# Patient Record
Sex: FEMALE | Race: BLACK OR AFRICAN AMERICAN | Employment: FULL TIME | URBAN - METROPOLITAN AREA
[De-identification: names, ages, dates, MRNs, and addresses within clinical notes are randomized per-mention and may not be internally consistent; named-entity substitution may affect disease eponyms.]

---

## 2021-11-10 PROBLEM — Z01.419 WOMEN'S ANNUAL ROUTINE GYNECOLOGICAL EXAMINATION: Status: ACTIVE | Noted: 2021-11-10

## 2021-11-10 PROBLEM — Z30.09 CONTRACEPTIVE EDUCATION: Status: ACTIVE | Noted: 2021-11-10

## 2021-11-10 PROBLEM — R10.2 PELVIC PAIN: Status: ACTIVE | Noted: 2021-11-10

## 2021-11-10 PROBLEM — N89.8 VAGINAL DISCHARGE: Status: ACTIVE | Noted: 2021-11-10

## 2021-12-08 PROBLEM — R87.810 CERVICAL HIGH RISK HPV (HUMAN PAPILLOMAVIRUS) TEST POSITIVE: Status: ACTIVE | Noted: 2021-12-08

## 2022-02-03 ENCOUNTER — HOSPITAL ENCOUNTER (OUTPATIENT)
Dept: LAB | Age: 32
Discharge: HOME | End: 2022-02-03

## 2022-02-03 PROBLEM — N92.0 MENORRHAGIA WITH REGULAR CYCLE: Status: ACTIVE | Noted: 2022-02-03

## 2022-02-03 PROBLEM — F34.1 DYSTHYMIA: Status: ACTIVE | Noted: 2022-02-03

## 2022-02-03 PROBLEM — N94.6 DYSMENORRHEA: Status: ACTIVE | Noted: 2022-02-03

## 2022-02-03 PROCEDURE — 88305 TISSUE EXAM BY PATHOLOGIST: CPT

## 2022-03-18 PROBLEM — N92.0 MENORRHAGIA WITH REGULAR CYCLE: Status: ACTIVE | Noted: 2022-02-03

## 2022-03-18 PROBLEM — Z30.09 CONTRACEPTIVE EDUCATION: Status: ACTIVE | Noted: 2021-11-10

## 2022-03-18 PROBLEM — R87.810 CERVICAL HIGH RISK HPV (HUMAN PAPILLOMAVIRUS) TEST POSITIVE: Status: ACTIVE | Noted: 2021-12-08

## 2022-03-19 PROBLEM — Z01.419 WOMEN'S ANNUAL ROUTINE GYNECOLOGICAL EXAMINATION: Status: ACTIVE | Noted: 2021-11-10

## 2022-03-19 PROBLEM — F34.1 DYSTHYMIA: Status: ACTIVE | Noted: 2022-02-03

## 2022-03-20 PROBLEM — N94.6 DYSMENORRHEA: Status: ACTIVE | Noted: 2022-02-03

## 2022-07-28 ENCOUNTER — TELEPHONE (OUTPATIENT)
Dept: OBGYN CLINIC | Age: 32
End: 2022-07-28

## 2022-07-28 NOTE — TELEPHONE ENCOUNTER
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 I called the patient back after talking with Dr. Jessica Izaguirre and he says that if the patient got the varicella vaccine she should be protected. I gave this information to the patient and she verbalizes understanding. She has a new ob appointment with us already scheduled.

## 2022-08-09 ENCOUNTER — ROUTINE PRENATAL (OUTPATIENT)
Dept: OBGYN CLINIC | Age: 32
End: 2022-08-09
Payer: COMMERCIAL

## 2022-08-09 VITALS
BODY MASS INDEX: 40.93 KG/M2 | HEIGHT: 67 IN | SYSTOLIC BLOOD PRESSURE: 112 MMHG | WEIGHT: 260.8 LBS | DIASTOLIC BLOOD PRESSURE: 78 MMHG

## 2022-08-09 DIAGNOSIS — Z34.81 MULTIGRAVIDA IN FIRST TRIMESTER: ICD-10-CM

## 2022-08-09 DIAGNOSIS — O26.849 FETAL SIZE INCONSISTENT WITH DATES: Primary | ICD-10-CM

## 2022-08-09 DIAGNOSIS — O34.01 SEPTATE UTERUS AFFECTING PREGNANCY IN FIRST TRIMESTER: ICD-10-CM

## 2022-08-09 DIAGNOSIS — Q51.28 SEPTATE UTERUS AFFECTING PREGNANCY IN FIRST TRIMESTER: ICD-10-CM

## 2022-08-09 DIAGNOSIS — O36.80X0 ENCOUNTER TO DETERMINE FETAL VIABILITY OF PREGNANCY, SINGLE OR UNSPECIFIED FETUS: ICD-10-CM

## 2022-08-09 PROBLEM — F34.1 DYSTHYMIA: Status: RESOLVED | Noted: 2022-02-03 | Resolved: 2022-08-09

## 2022-08-09 PROBLEM — N94.6 DYSMENORRHEA: Status: RESOLVED | Noted: 2022-02-03 | Resolved: 2022-08-09

## 2022-08-09 PROBLEM — N92.0 MENORRHAGIA WITH REGULAR CYCLE: Status: RESOLVED | Noted: 2022-02-03 | Resolved: 2022-08-09

## 2022-08-09 PROBLEM — Z30.09 CONTRACEPTIVE EDUCATION: Status: RESOLVED | Noted: 2021-11-10 | Resolved: 2022-08-09

## 2022-08-09 PROBLEM — Z01.419 WOMEN'S ANNUAL ROUTINE GYNECOLOGICAL EXAMINATION: Status: RESOLVED | Noted: 2021-11-10 | Resolved: 2022-08-09

## 2022-08-09 LAB
ABO + RH BLD: NORMAL
BLOOD GROUP ANTIBODIES SERPL: NORMAL
ERYTHROCYTE [DISTWIDTH] IN BLOOD BY AUTOMATED COUNT: 12.7 % (ref 11.9–14.6)
HBV SURFACE AG SER QL: NONREACTIVE
HCT VFR BLD AUTO: 39 % (ref 35.8–46.3)
HCV AB SER QL: NONREACTIVE
HGB BLD-MCNC: 13 G/DL (ref 11.7–15.4)
HIV 1+2 AB+HIV1 P24 AG SERPL QL IA: NONREACTIVE
HIV 1/2 RESULT COMMENT: NORMAL
MCH RBC QN AUTO: 29.7 PG (ref 26.1–32.9)
MCHC RBC AUTO-ENTMCNC: 33.3 G/DL (ref 31.4–35)
MCV RBC AUTO: 89.2 FL (ref 79.6–97.8)
NRBC # BLD: 0 K/UL (ref 0–0.2)
PLATELET # BLD AUTO: 435 K/UL (ref 150–450)
PMV BLD AUTO: 10 FL (ref 9.4–12.3)
RBC # BLD AUTO: 4.37 M/UL (ref 4.05–5.2)
RUBV IGG SERPL IA-ACNC: 36.2 IU/ML (ref 0–50)
WBC # BLD AUTO: 5 K/UL (ref 4.3–11.1)

## 2022-08-09 PROCEDURE — 76801 OB US < 14 WKS SINGLE FETUS: CPT | Performed by: NURSE PRACTITIONER

## 2022-08-09 PROCEDURE — 99902 PR PRENATAL VISIT: CPT | Performed by: NURSE PRACTITIONER

## 2022-08-09 NOTE — PROGRESS NOTES
activity: Not on file   Other Topics Concern    Not on file   Social History Narrative    Abuse: Feels safe at home, no history of physical abuse, no history of sexual abuse      Social Determinants of Health     Financial Resource Strain: Not on file   Food Insecurity: Not on file   Transportation Needs: Not on file   Physical Activity: Not on file   Stress: Not on file   Social Connections: Not on file   Intimate Partner Violence: Not on file   Housing Stability: Not on file           Objective    Vitals:    08/09/22 1038   BP: 112/78   Site: Left Upper Arm   Position: Sitting   Weight: 260 lb 12.8 oz (118.3 kg)   Height: 5' 7\" (1.702 m)       General: well developed, well nourished, in no acute distress    Head: normocephalic and atraumatic    Resp: even and unlabored    Psych: Normal mood and affect        Assessment and Plan      Patient Active Problem List    Diagnosis Date Noted    Multigravida in first trimester 08/09/2022     Priority: Medium     Overview Note:     RENEE 3/16/2022 by 8wk US (not c/w LMP)       Assessment & Plan Note:     History reviewed  PNV's ordered (if not already taking)  PN labs, UDS ordered  Problem list reviewed  OB physical completed  OB prenatal packet/education reviewed: Information included discusses general pregnancy topics, fetal development, weight gain, nutrition, breastfeeding, risky behaviors, WIC, vaccinations and hospital information. RTO 4 weeks  PTL/labor precautions, [address], and pregnancy warning signs reviewed. Genetic testing - D/W pt at length genetic testing that is recommended by ACOG -- NIPT, Quad screen (for trisomies and NTD), CF, SMA. Brief discussion of these diseases - conditions that may increase risks, etiology, carrier states, fetal effects, treatment options, etc was undertaken. D/W pt that these are screening tests/carrier screening test only and are NOT mandatory. We also discuss false POS/false neg rates.  It is her decision whether to have them done and how to proceed with the information afterwards. 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 septate vs bicornuate uterus. . Pt denies previous dx of this or  any complications/PTL in previous pregnancies. 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 RTO 4 weeks  PTL/labor precautions, [address], and pregnancy warning signs reviewed. Genetic testing - D/W pt at length genetic testing that is recommended by ACOG -- NIPT, Quad screen (for trisomies and NTD), CF, SMA. Brief discussion of these diseases - conditions that may increase risks, etiology, carrier states, fetal effects, treatment options, etc was undertaken. D/W pt that these are screening tests/carrier screening test only and are NOT mandatory. We also discuss false POS/false neg rates. It is her decision whether to have them done and how to proceed with the information afterwards.                  Relevant Orders    ABO/Rh    Antibody Screen    CBC    Hemoglobinopathy Evaluation    Hepatitis B Surface Antigen    Hepatitis C Antibody    RPR    HIV 1/2 Ag/Ab, 4TH Generation,W Rflx Confirm    Rubella antibody, IgG    Hemoglobin A1C    Chlamydia, Gonorrhea, Trichomoniasis   Other Visit Diagnoses     Fetal size inconsistent with dates    -  Primary    Relevant Orders    AMB POC US OB < 14 WKS, 1ST GESTATION (Completed)    Encounter to determine fetal viability of pregnancy, single or unspecified fetus        Relevant Orders    AMB POC US OB < 14 WKS, 1ST GESTATION (Completed)          Orders Placed This Encounter   Procedures    Chlamydia, Gonorrhea, Trichomoniasis    AMB POC US OB < 14 WKS, 1ST GESTATION    CBC    Hemoglobinopathy Evaluation    Hepatitis B Surface Antigen    Hepatitis C Antibody    RPR    HIV 1/2 Ag/Ab, 4TH Generation,W Rflx Confirm    Rubella antibody, IgG    Hemoglobin A1C    ABO/Rh    Antibody Screen       Outpatient Encounter Medications as of 8/9/2022   Medication Sig Dispense Refill    Prenatal Vit-Fe Fumarate-FA (PRENATAL PO) Take by mouth      [DISCONTINUED] norethindrone-ethinyl estradiol (JUNEL FE 1/20) 1-20 MG-MCG per tablet Take 1 tablet by mouth daily       No facility-administered encounter medications on file as of 8/9/2022.                Labor signs, pregnancy warning signs, and fetal movement counting reviewed (if applicable)        Debra Ledesma NP, APRN - CNP 08/09/22 11:03 AM

## 2022-08-09 NOTE — PROGRESS NOTES
Patient comes in today for initial prenatal visit. No complaints/concerns today. Fetal Movements:  No  Contractions:  No  Vaginal Bleeding: No  Leaking Fluid:  No  GI/ issues: No    Drug/Alcohol 4P's Plus Screening    1. Have either of your parents ever had a problem with drugs/alcohol/prescription drugs? No  2. Does your partner have a problem with drugs/alcohol/prescription drugs? No  3. In the past, have you ever had a problem with drugs/alcohol/prescription drugs? No  4. Before you were pregnant, in the past month, have you done any drugs, drank any alcohol or abused any prescription drugs? No    If \"YES\" to any of the above, please give further details:  NA      LAST PAP:  10/8/2021 negative, hpv +    LAST MAMMO:  never    LMP:  Patient's last menstrual period was 05/20/2022.       FAMILY HISTORY OF:   Breast Cancer:  No   Ovarian Cancer: No   Uterine Cancer:  No   Colon Cancer:  No    Vitals:    08/09/22 1038   BP: 112/78   Site: Left Upper Arm   Position: Sitting   Weight: 260 lb 12.8 oz (118.3 kg)   Height: 5' 7\" (1.702 m)       Zhang Hernandez MA  08/09/22  10:49 AM

## 2022-08-09 NOTE — PROGRESS NOTES
I have reviewed the patient's visit today including history, exam and assessment by CORTNEY Ross. I agree with treatment/plan as above.     Idris Méndez MD  11:23 AM  08/09/22

## 2022-08-09 NOTE — ASSESSMENT & PLAN NOTE
History reviewed  PNV's ordered (if not already taking)  PN labs, UDS ordered  Problem list reviewed  OB physical completed  OB prenatal packet/education reviewed: Information included discusses general pregnancy topics, fetal development, weight gain, nutrition, breastfeeding, risky behaviors, WIC, vaccinations and hospital information. RTO 4 weeks  PTL/labor precautions, [facility], and pregnancy warning signs reviewed. Genetic testing - D/W pt at length genetic testing that is recommended by ACOG -- NIPT, Quad screen (for trisomies and NTD), CF, SMA. Brief discussion of these diseases - conditions that may increase risks, etiology, carrier states, fetal effects, treatment options, etc was undertaken. D/W pt that these are screening tests/carrier screening test only and are NOT mandatory. We also discuss false POS/false neg rates. It is her decision whether to have them done and how to proceed with the information afterwards.

## 2022-08-09 NOTE — PATIENT INSTRUCTIONS
Thank you for coming. Return to the office in 4 weeks. Please call if you have any abdominal pain and 5 contractions in 1 hour, vaginal bleeding or spotting, or leaking of fluid.

## 2022-08-10 LAB
EST. AVERAGE GLUCOSE BLD GHB EST-MCNC: 105 MG/DL
HBA1C MFR BLD: 5.3 % (ref 4.8–5.6)
RPR SER QL: NONREACTIVE

## 2022-08-11 ENCOUNTER — HOSPITAL ENCOUNTER (EMERGENCY)
Age: 32
Discharge: HOME | End: 2022-08-11
Attending: EMERGENCY MEDICINE
Payer: COMMERCIAL

## 2022-08-11 VITALS
DIASTOLIC BLOOD PRESSURE: 80 MMHG | SYSTOLIC BLOOD PRESSURE: 125 MMHG | RESPIRATION RATE: 18 BRPM | BODY MASS INDEX: 40.81 KG/M2 | TEMPERATURE: 98.2 F | HEART RATE: 83 BPM | OXYGEN SATURATION: 99 % | HEIGHT: 67 IN | WEIGHT: 260 LBS

## 2022-08-11 DIAGNOSIS — R10.32 ABDOMINAL PAIN, LEFT LOWER QUADRANT: Primary | ICD-10-CM

## 2022-08-11 DIAGNOSIS — N83.202 CYST OF LEFT OVARY: ICD-10-CM

## 2022-08-11 LAB
HCG UR QL: POSITIVE
HGB A MFR BLD: 97.4 % (ref 96.4–98.8)
HGB A2 MFR BLD COLUMN CHROM: 2.6 % (ref 1.8–3.2)
HGB F MFR BLD: 0 % (ref 0–2)
HGB FRACT BLD-IMP: NORMAL
HGB S MFR BLD: 0 %

## 2022-08-11 PROCEDURE — 99283 EMERGENCY DEPT VISIT LOW MDM: CPT

## 2022-08-11 PROCEDURE — 81025 URINE PREGNANCY TEST: CPT

## 2022-08-11 RX ORDER — HYDROCODONE BITARTRATE AND ACETAMINOPHEN 5; 325 MG/1; MG/1
1 TABLET ORAL EVERY 6 HOURS PRN
Qty: 17 TABLET | Refills: 0 | Status: SHIPPED | OUTPATIENT
Start: 2022-08-11 | End: 2022-08-14

## 2022-08-11 ASSESSMENT — PAIN SCALES - GENERAL
PAINLEVEL_OUTOF10: 3
PAINLEVEL_OUTOF10: 3

## 2022-08-11 ASSESSMENT — ENCOUNTER SYMPTOMS
CONSTIPATION: 0
HEMATEMESIS: 0
EYE REDNESS: 0
EYE PAIN: 0
SINUS PAIN: 0
VOMITING: 0
HEMATOCHEZIA: 0
DIARRHEA: 0
SHORTNESS OF BREATH: 0
EYE ITCHING: 0
WHEEZING: 0
COUGH: 0
ABDOMINAL PAIN: 1
BACK PAIN: 0
TROUBLE SWALLOWING: 0
NAUSEA: 1

## 2022-08-11 ASSESSMENT — PAIN DESCRIPTION - DESCRIPTORS: DESCRIPTORS: ACHING

## 2022-08-11 ASSESSMENT — PAIN - FUNCTIONAL ASSESSMENT: PAIN_FUNCTIONAL_ASSESSMENT: 0-10

## 2022-08-11 ASSESSMENT — PAIN DESCRIPTION - LOCATION: LOCATION: ABDOMEN

## 2022-08-11 ASSESSMENT — PAIN DESCRIPTION - ORIENTATION: ORIENTATION: LEFT

## 2022-08-11 NOTE — ED NOTES
I have reviewed discharge instructions with the patient. The patient verbalized understanding. Patient left ED via Discharge Method: ambulatory to Home with SELF. Opportunity for questions and clarification provided. Patient given 1 scripts. To continue your aftercare when you leave the hospital, you may receive an automated call from our care team to check in on how you are doing. This is a free service and part of our promise to provide the best care and service to meet your aftercare needs.  If you have questions, or wish to unsubscribe from this service please call [phone]. Thank you for Choosing our [address] [place] [address] Emergency Department.         Stalin Gutierrez RN  08/11/22 6928

## 2022-08-11 NOTE — ED PROVIDER NOTES
[facility] Emergency Department Provider Note                   PCP:                None Provider               Age: 27 y.o. Sex: female       ICD-10-CM    1. Abdominal pain, left lower quadrant  R10.32 HYDROcodone-acetaminophen (NORCO) 5-325 MG per tablet      2. 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 2:14 AM  Urine negative for infection    We will start the patient on some pain medication  Strongly encouraged her to follow-up with her OB    Discussed return precautions       Amount and/or Complexity of Data Reviewed  Clinical lab tests: ordered and reviewed  Tests in the radiology section of CPT®: reviewed  Tests in the medicine section of CPT®: reviewed  Decide to obtain previous medical records or to obtain history from someone other than the patient: yes  Review and summarize past medical records: yes  Independent visualization of images, tracings, or specimens: yes    Risk of Complications, Morbidity, and/or Mortality  Presenting problems: moderate  Diagnostic procedures: low  Management options: low  General comments: Elements of this note have been dictated via voice recognition software. Text and phrases may be limited by the accuracy of the software. The chart has been reviewed, but errors may still be present. Patient Progress  Patient progress: stable       Orders Placed This Encounter   Procedures    POC Pregnancy Urine Art Coco is a 27 y.o. female who presents to the Emergency Department with chief complaint of    Chief Complaint   Patient presents with    Abdominal Pain      27 y.o. female patient [id number] at 5 weeks gestation     Due Date is 3/16/2023, by Ultrasound     Presents tonight with complaints of left lower quadrant abdominal pain  Patient reports she had an ultrasound with her OB that revealed a left ovarian cyst.       The history is provided by the patient. Abdominal Pain  Pain location:  LLQ  Pain quality: aching    Pain radiates to:  Does not radiate  Pain severity:  Moderate  Onset quality:  Gradual  Duration:  2 days  Timing:  Constant  Progression:  Waxing and waning  Chronicity:  New  Context: not diet changes, not recent illness and not recent travel    Context comment:  Patient approximately 9 weeks pregnant  Relieved by:  Position changes  Worsened by: Movement  Ineffective treatments:  None tried  Associated symptoms: nausea    Associated symptoms: no chest pain, no chills, no constipation, no cough, no diarrhea, no dysuria, no fatigue, no fever, no hematemesis, no hematochezia, no hematuria, no shortness of breath and no vomiting    Risk factors: pregnancy      All other systems reviewed and are negative. Review of Systems   Constitutional:  Negative for chills, fatigue and fever. HENT:  Negative for ear pain, hearing loss, sinus pain, sneezing and trouble swallowing. Eyes:  Negative for pain, redness and itching. Respiratory:  Negative for cough, shortness of breath and wheezing. Cardiovascular:  Negative for chest pain and palpitations. Gastrointestinal:  Positive for abdominal pain and nausea. Negative for constipation, diarrhea, hematemesis, hematochezia and vomiting. Endocrine: Negative for polydipsia, polyphagia and polyuria. Genitourinary:  Negative for dysuria, flank pain, frequency and hematuria. Musculoskeletal:  Negative for arthralgias, back pain and myalgias. Skin:  Negative for rash and wound. Allergic/Immunologic: Negative for food allergies and immunocompromised state.    Neurological:  Negative for dizziness, syncope, speech difficulty and light-headedness. Hematological:  Negative for adenopathy. Does not bruise/bleed easily. Psychiatric/Behavioral:  Negative for dysphoric mood and self-injury. The patient is not nervous/anxious. All other systems reviewed and are negative. Past Medical History:   Diagnosis Date    Abnormal Papanicolaou smear of cervix     Dysthymia 2/3/2022        History reviewed. No pertinent surgical history. Family History   Problem Relation Age of Onset    Breast Cancer Neg Hx     Colon Cancer Neg Hx     Ovarian Cancer Neg Hx     Uterine Cancer Neg Hx         Social History     Socioeconomic History    Marital status: Single     Spouse name: None    Number of children: None    Years of education: None    Highest education level: None   Tobacco Use    Smoking status: Never    Smokeless tobacco: Never   Substance and Sexual Activity    Alcohol use: No    Drug use: Not Currently   Social History Narrative    Abuse: Feels safe at home, no history of physical abuse, no history of sexual abuse         Allergies: Penicillins    Discharge Medication List as of 8/11/2022  1:33 AM        CONTINUE these medications which have NOT CHANGED    Details   Prenatal Vit-Fe Fumarate-FA (PRENATAL PO) Take by mouthHistorical Med              Vitals signs and nursing note reviewed. Patient Vitals for the past 4 hrs:   Temp Pulse Resp BP SpO2   08/11/22 0130 98.2 °F (36.8 °C) 83 18 125/80 99 %   08/11/22 0038 98.2 °F (36.8 °C) 83 18 125/81 100 %          Physical Exam  Vitals and nursing note reviewed. Constitutional:       General: She is in acute distress. Appearance: Normal appearance. She is well-developed. She is obese. HENT:      Head: Normocephalic and atraumatic. Right Ear: External ear normal.      Left Ear: External ear normal.      Nose: Nose normal.   Eyes:      General: No scleral icterus. Extraocular Movements: Extraocular movements intact.       Conjunctiva/sclera: Conjunctivae normal. Pupils: Pupils are equal, round, and reactive to light. Cardiovascular:      Rate and Rhythm: Normal rate and regular rhythm. Pulses: Normal pulses. Heart sounds: Normal heart sounds. Pulmonary:      Effort: Pulmonary effort is normal. No respiratory distress. Breath sounds: Normal breath sounds. Abdominal:      General: Abdomen is flat. Bowel sounds are normal. There is no distension. Palpations: Abdomen is soft. There is no mass. Tenderness: There is abdominal tenderness in the left lower quadrant. There is no right CVA tenderness or left CVA tenderness. Musculoskeletal:         General: No deformity or signs of injury. Normal range of motion. Cervical back: Normal range of motion and neck supple. Skin:     General: Skin is warm and dry. Capillary Refill: Capillary refill takes less than 2 seconds. Neurological:      General: No focal deficit present. Mental Status: She is alert and oriented to person, place, and time. Psychiatric:         Mood and Affect: Mood normal.         Behavior: Behavior normal.         Thought Content: Thought content normal.         Judgment: Judgment normal.        Procedures    ED EKG Interpretation    Labs Reviewed   POC PREGNANCY UR-QUAL - Abnormal; Notable for the following components:       Result Value    Preg Test, Ur Positive (*)     All other components within normal limits        No orders to display                            Voice dictation software was used during the making of this note. This software is not perfect and grammatical and other typographical errors may be present. This note has not been completely proofread for errors.      Brendon Maravilla MD  08/11/22 8832

## 2022-08-11 NOTE — DISCHARGE INSTRUCTIONS
Take medication as prescribed  Call your OB doctor in the morning to discuss further medication and follow-up treatment  Continue all your current medications  Do not drink alcohol or drive while taking the prescription pain medications    Return to ER for any worsening symptoms or new problems which may arise

## 2022-08-12 LAB
C TRACH RRNA SPEC QL NAA+PROBE: NEGATIVE
N GONORRHOEA RRNA SPEC QL NAA+PROBE: NEGATIVE
SPECIMEN SOURCE: NORMAL
T VAGINALIS RRNA SPEC QL NAA+PROBE: NEGATIVE

## 2022-08-17 ENCOUNTER — ROUTINE PRENATAL (OUTPATIENT)
Dept: OBGYN CLINIC | Age: 32
End: 2022-08-17

## 2022-08-17 VITALS
BODY MASS INDEX: 40.97 KG/M2 | HEIGHT: 67 IN | DIASTOLIC BLOOD PRESSURE: 70 MMHG | SYSTOLIC BLOOD PRESSURE: 122 MMHG | WEIGHT: 261 LBS

## 2022-08-17 DIAGNOSIS — N89.8 VAGINAL DISCHARGE DURING PREGNANCY IN FIRST TRIMESTER: Primary | ICD-10-CM

## 2022-08-17 DIAGNOSIS — O26.891 VAGINAL DISCHARGE DURING PREGNANCY IN FIRST TRIMESTER: Primary | ICD-10-CM

## 2022-08-17 DIAGNOSIS — Z34.81 MULTIGRAVIDA IN FIRST TRIMESTER: ICD-10-CM

## 2022-08-17 DIAGNOSIS — B37.9 YEAST INFECTION: ICD-10-CM

## 2022-08-17 PROCEDURE — 99902 PR PRENATAL VISIT: CPT | Performed by: NURSE PRACTITIONER

## 2022-08-17 ASSESSMENT — PATIENT HEALTH QUESTIONNAIRE - PHQ9
SUM OF ALL RESPONSES TO PHQ9 QUESTIONS 1 & 2: 0
SUM OF ALL RESPONSES TO PHQ QUESTIONS 1-9: 0
2. FEELING DOWN, DEPRESSED OR HOPELESS: 0
SUM OF ALL RESPONSES TO PHQ QUESTIONS 1-9: 0
SUM OF ALL RESPONSES TO PHQ QUESTIONS 1-9: 0
1. LITTLE INTEREST OR PLEASURE IN DOING THINGS: 0
SUM OF ALL RESPONSES TO PHQ QUESTIONS 1-9: 0

## 2022-08-17 NOTE — PROGRESS NOTES
This is a 27 y.o.  [id number] at 9w6d for routine OB visit. Her Estimated Due Date is 3/16/2023, by Ultrasound    Denies leaking of fluid, vaginal bleeding, or regular contractions. Pt is here today due to green discharge and itching that started monday    Current Outpatient Medications on File Prior to Visit   Medication Sig Dispense Refill    Prenatal Vit-Fe Fumarate-FA (PRENATAL PO) Take by mouth       No current facility-administered medications on file prior to visit. Allergies   Allergen Reactions    Penicillins Other (See Comments)       OB History    Para Term  AB Living   4 2 2 0 1 2   SAB IAB Ectopic Molar Multiple Live Births   1 0 0 0 0 2       # 1 - Date: [birth year], Sex: None, Weight: None, GA: 38w0d, Delivery: Vaginal, Spontaneous, Apgar1: None, Apgar5: None, Living: Living, Birth Comments: None    # 2 - Date: 04/30/15, Sex: Male, Weight: None, GA: 38w0d, Delivery: Vaginal, Spontaneous, Apgar1: None, Apgar5: None, Living: Living, Birth Comments: None    # 3 - Date: 2016, Sex: None, Weight: None, GA: None, Delivery: None, Apgar1: None, Apgar5: None, Living: None, Birth Comments: None    # 4 - Date: None, Sex: None, Weight: None, GA: None, Delivery: None, Apgar1: None, Apgar5: None, Living: None, Birth Comments: None        Past Medical History:   Diagnosis Date    Abnormal Papanicolaou smear of cervix     Dysthymia 2/3/2022       History reviewed. No pertinent surgical history.     Family History   Problem Relation Age of Onset    Breast Cancer Neg Hx     Colon Cancer Neg Hx     Ovarian Cancer Neg Hx     Uterine Cancer Neg Hx        Social History     Socioeconomic History    Marital status: Single     Spouse name: Not on file    Number of children: Not on file    Years of education: Not on file    Highest education level: Not on file   Occupational History    Not on file   Tobacco Use    Smoking status: Never    Smokeless tobacco: Never   Substance and Sexual Activity    Alcohol use: No    Drug use: Not Currently    Sexual activity: Not on file   Other Topics Concern    Not on file   Social History Narrative    Abuse: Feels safe at home, no history of physical abuse, no history of sexual abuse      Social Determinants of Health     Financial Resource Strain: Not on file   Food Insecurity: Not on file   Transportation Needs: Not on file   Physical Activity: Not on file   Stress: Not on file   Social Connections: Not on file   Intimate Partner Violence: Not on file   Housing Stability: Not on file           Objective    Vitals:    08/17/22 1050   BP: 122/70   Site: Left Upper Arm   Position: Sitting   Weight: 261 lb (118.4 kg)   Height: 5' 7\" (1.702 m)       General: well developed, well nourished, in no acute distress    Head: normocephalic and atraumatic    Resp: even and unlabored    Pelvic Exam:       External: normal female genitalia without lesions or masses       Vagina: normal without lesions or masses, large amt of green cottage cheese discharge noted      Cervix: normal without lesions or masses       Psych: Normal mood and affect        Assessment and Plan      Patient Active Problem List    Diagnosis Date Noted    Yeast infection 08/17/2022     Priority: Medium     Assessment & Plan Note:     Exam c/w yeast, terazol 7 sent     Boo Ledbetter in first trimester 08/09/2022     Priority: Medium     Overview Note:     RENEE 3/16/2022 by 8wk US (not c/w LMP)       Assessment & Plan Note:     PTL/labor precautions, [facility], and pregnancy warning signs reviewed. Pt advised to call the office at [phone] or go straight to Labor and Delivery at [facility] with any of the following concerns vaginal bleeding, leaking of fluid, jason regularly Q 5-7 minutes for over an hour or not feeling the baby move.    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 septate vs bicornuate uterus. . Pt denies previous dx of this or  any complications/PTL in previous pregnancies. Will recheck CL and reassess at 16 weeks      Cervical high risk HPV (human papillomavirus) test positive 2021     Overview Note:     On pap done 2020 in [place] and again 2021           Problem List Items Addressed This Visit        Other    Huma Ramsay in first trimester     PTL/labor precautions, [address], and pregnancy warning signs reviewed. Pt advised to call the office at [phone] or go straight to Labor and Delivery at [address] with any of the following concerns vaginal bleeding, leaking of fluid, jason regularly Q 5-7 minutes for over an hour or not feeling the baby move. RTO 3 weeks as already scheduled         Yeast infection     Exam c/w yeast, terazol 7 sent        Other Visit Diagnoses     Vaginal discharge during pregnancy in first trimester    -  Primary    Relevant Orders    Nuswab Vaginitis Plus (VG+)          Orders Placed This Encounter   Procedures    Nuswab Vaginitis Plus (VG+)       Outpatient Encounter Medications as of 2022   Medication Sig Dispense Refill    terconazole (TERAZOL 7) 0.4 % vaginal cream Place vaginally nightly. 45 g 0    Prenatal Vit-Fe Fumarate-FA (PRENATAL PO) Take by mouth       No facility-administered encounter medications on file as of 2022.                Labor signs, pregnancy warning signs, and fetal movement counting reviewed (if applicable)        Arnoldo Bradford NP, APRN - CNP 22 11:18 AM OVERWEIGHT

## 2022-08-17 NOTE — ASSESSMENT & PLAN NOTE
PTL/labor precautions, [address], and pregnancy warning signs reviewed. Pt advised to call the office at [phone] or go straight to Labor and Delivery at [address] with any of the following concerns vaginal bleeding, leaking of fluid, jason regularly Q 5-7 minutes for over an hour or not feeling the baby move.    RTO 3 weeks as already scheduled

## 2022-08-17 NOTE — PROGRESS NOTES
Patient comes in today for green vaginal discharge that she noticed since Monday. Patient states it has increased in the past couple days.       Fetal Movement: No  Contractions: No  Vaginal Bleeding: No  Leaking Fluid: No  GI/: No    Vitals:    08/17/22 1050   BP: 122/70   Site: Left Upper Arm   Position: Sitting   Weight: 261 lb (118.4 kg)   Height: 5' 7\" (1.702 m)

## 2022-08-23 LAB
A VAGINAE DNA VAG QL NAA+PROBE: ABNORMAL SCORE
BVAB2 DNA VAG QL NAA+PROBE: ABNORMAL SCORE
C ALBICANS DNA VAG QL NAA+PROBE: POSITIVE
C GLABRATA DNA VAG QL NAA+PROBE: NEGATIVE
C TRACH RRNA SPEC QL NAA+PROBE: NEGATIVE
MEGA1 DNA VAG QL NAA+PROBE: ABNORMAL SCORE
N GONORRHOEA RRNA SPEC QL NAA+PROBE: NEGATIVE
SPECIMEN SOURCE: ABNORMAL
T VAGINALIS RRNA SPEC QL NAA+PROBE: NEGATIVE

## 2022-08-31 ENCOUNTER — TELEPHONE (OUTPATIENT)
Dept: OBGYN CLINIC | Age: 32
End: 2022-08-31

## 2022-08-31 DIAGNOSIS — O09.891 CYSTIC FIBROSIS CARRIER IN FIRST TRIMESTER, ANTEPARTUM: ICD-10-CM

## 2022-08-31 DIAGNOSIS — Z14.1 CYSTIC FIBROSIS CARRIER IN FIRST TRIMESTER, ANTEPARTUM: ICD-10-CM

## 2022-08-31 DIAGNOSIS — Z34.81 MULTIGRAVIDA IN FIRST TRIMESTER: ICD-10-CM

## 2022-08-31 NOTE — TELEPHONE ENCOUNTER
NIPT unable to be resulted due to low fetal fraction. Please schedule redraw      2. 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 If the FOB also carried the gene, The baby would have to inherit the gene from both she and FOB which is a 25% chance      Please send pt via [place] the ACOG CF handout  Offer genetics counseling - If she wants, place referral to [facility]  Offer testing for FOB - if she wants we can arrange via [employer]          3.  SMA, DMD, and Fragile X carrier screening is negative

## 2022-08-31 NOTE — TELEPHONE ENCOUNTER
Below message reviewed with pt and pt verbalized understanding. Will let us know if FOB would like testing for carrier screening.  Pt will RTO tomorrow 9/1/22 for repeat NIPT

## 2022-09-08 ENCOUNTER — ROUTINE PRENATAL (OUTPATIENT)
Dept: OBGYN CLINIC | Age: 32
End: 2022-09-08

## 2022-09-08 VITALS
BODY MASS INDEX: 41.12 KG/M2 | WEIGHT: 262 LBS | SYSTOLIC BLOOD PRESSURE: 120 MMHG | HEIGHT: 67 IN | DIASTOLIC BLOOD PRESSURE: 72 MMHG

## 2022-09-08 DIAGNOSIS — O34.02 SEPTATE UTERUS AFFECTING PREGNANCY IN SECOND TRIMESTER: ICD-10-CM

## 2022-09-08 DIAGNOSIS — R87.810 CERVICAL HIGH RISK HPV (HUMAN PAPILLOMAVIRUS) TEST POSITIVE: ICD-10-CM

## 2022-09-08 DIAGNOSIS — Z14.1 CYSTIC FIBROSIS CARRIER: ICD-10-CM

## 2022-09-08 DIAGNOSIS — Z34.81 MULTIGRAVIDA IN FIRST TRIMESTER: ICD-10-CM

## 2022-09-08 DIAGNOSIS — Q51.28 SEPTATE UTERUS AFFECTING PREGNANCY IN SECOND TRIMESTER: ICD-10-CM

## 2022-09-08 PROCEDURE — 99902 PR PRENATAL VISIT: CPT | Performed by: OBSTETRICS & GYNECOLOGY

## 2022-09-08 ASSESSMENT — PATIENT HEALTH QUESTIONNAIRE - PHQ9
8. MOVING OR SPEAKING SO SLOWLY THAT OTHER PEOPLE COULD HAVE NOTICED. OR THE OPPOSITE, BEING SO FIGETY OR RESTLESS THAT YOU HAVE BEEN MOVING AROUND A LOT MORE THAN USUAL: 0
9. THOUGHTS THAT YOU WOULD BE BETTER OFF DEAD, OR OF HURTING YOURSELF: 0
7. TROUBLE CONCENTRATING ON THINGS, SUCH AS READING THE NEWSPAPER OR WATCHING TELEVISION: 0
SUM OF ALL RESPONSES TO PHQ QUESTIONS 1-9: 8
5. POOR APPETITE OR OVEREATING: 1
3. TROUBLE FALLING OR STAYING ASLEEP: 3
2. FEELING DOWN, DEPRESSED OR HOPELESS: 1
SUM OF ALL RESPONSES TO PHQ QUESTIONS 1-9: 8
10. IF YOU CHECKED OFF ANY PROBLEMS, HOW DIFFICULT HAVE THESE PROBLEMS MADE IT FOR YOU TO DO YOUR WORK, TAKE CARE OF THINGS AT HOME, OR GET ALONG WITH OTHER PEOPLE: 1
4. FEELING TIRED OR HAVING LITTLE ENERGY: 2
SUM OF ALL RESPONSES TO PHQ QUESTIONS 1-9: 8
1. LITTLE INTEREST OR PLEASURE IN DOING THINGS: 1
SUM OF ALL RESPONSES TO PHQ QUESTIONS 1-9: 8
SUM OF ALL RESPONSES TO PHQ9 QUESTIONS 1 & 2: 2
6. FEELING BAD ABOUT YOURSELF - OR THAT YOU ARE A FAILURE OR HAVE LET YOURSELF OR YOUR FAMILY DOWN: 0

## 2022-09-08 NOTE — PROGRESS NOTES
Chief Complaint   Patient presents with    Routine Prenatal Visit        This 27 y.o. [id number] at 13w0d with Estimated Date of Delivery: 3/16/23 presents for routine prenatal visit. Patient has no complaints today. Pt reports no LOF, VB, ctx. Pt denies H/A, vision changes, abdom pain, N/V. Vitals:    22 0855   BP: 120/72   Site: Left Upper Arm   Position: Sitting   Weight: 262 lb (118.8 kg)   Height: 5' 7\" (1.702 m)        Patient Active Problem List    Diagnosis Date Noted    Cystic fibrosis carrier 2022     Priority: Medium     Overview Note:     22:  ACOG info prev given to pt, D/W her FOB testing and/or genetic counseling       Assessment & Plan Note:     noted      Septate uterus affecting pregnancy in second trimester 2022     Priority: Medium     Overview Note:     2022: US today ? septate vs bicornuate uterus. . Pt denies previous dx of this or  any complications/PTL in previous pregnancies. Will recheck CL and reassess at 16 weeks       Assessment & Plan Note:     noted      Multigravida in first trimester 2022     Overview Note:     EDC by 8 5/7 week US (not c/w LMP)    2022: NIPT no result, low fetal fraction  SMA, DMD, and Fragile X carrier screening negative       Assessment & Plan Note:     Instructed pt to contact the office or seek immediate care if develops fever > 101.0, severe lower abdominal pain or heavy vaginal bleeding (soaking 2 or more pads per hour).          Cervical high risk HPV (human papillomavirus) test positive 2021     Overview Note:     On pap done 2020 in [place] and again 2021:  colpo neg         Assessment & Plan Note:     noted        Problem List Items Addressed This Visit        Genitourinary    Septate uterus affecting pregnancy in second trimester     noted            Other    Cystic fibrosis carrier     noted         Cervical high risk HPV (human papillomavirus) test positive     noted Multigravida in first trimester     Instructed pt to contact the office or seek immediate care if develops fever > 101.0, severe lower abdominal pain or heavy vaginal bleeding (soaking 2 or more pads per hour).                 Clarissa Maravilla MD     9:22 AM    09/08/22

## 2022-09-08 NOTE — PATIENT INSTRUCTIONS
Please contact the office or seek immediate care if you develop fever > 101.0, severe lower abdominal pain or heavy vaginal bleeding (soaking 2 or more pads per hour). Thanks for coming to see us today and letting us take care of you!
No

## 2022-09-08 NOTE — PROGRESS NOTES
Patient comes in today for routine prenatal visit. No complaints/concerns today.      Fetal Movement: No  Contractions: No  Vaginal Bleeding: No  Leaking Fluid: No  GI/: No    Vitals:    09/08/22 0855   BP: 120/72   Site: Left Upper Arm   Position: Sitting   Weight: 262 lb (118.8 kg)   Height: 5' 7\" (1.702 m)

## 2022-09-08 NOTE — ASSESSMENT & PLAN NOTE
Instructed pt to contact the office or seek immediate care if develops fever > 101.0, severe lower abdominal pain or heavy vaginal bleeding (soaking 2 or more pads per hour).

## 2022-09-09 ENCOUNTER — TELEPHONE (OUTPATIENT)
Dept: OBGYN CLINIC | Age: 32
End: 2022-09-09

## 2022-09-09 DIAGNOSIS — Z34.81 MULTIGRAVIDA IN FIRST TRIMESTER: ICD-10-CM

## 2022-09-09 LAB — NIPT, EXTERNAL: NORMAL

## 2022-09-09 NOTE — TELEPHONE ENCOUNTER
Please let pt know her NIPT screening test tests were all normal      If she wants to know the sex of the baby it is a baby girl

## 2022-09-24 ENCOUNTER — HOSPITAL ENCOUNTER (EMERGENCY)
Age: 32
Discharge: HOME | End: 2022-09-24
Attending: EMERGENCY MEDICINE
Payer: COMMERCIAL

## 2022-09-24 VITALS
RESPIRATION RATE: 16 BRPM | BODY MASS INDEX: 41.12 KG/M2 | WEIGHT: 262 LBS | OXYGEN SATURATION: 99 % | HEIGHT: 67 IN | HEART RATE: 96 BPM | TEMPERATURE: 98.5 F | SYSTOLIC BLOOD PRESSURE: 131 MMHG | DIASTOLIC BLOOD PRESSURE: 81 MMHG

## 2022-09-24 DIAGNOSIS — N30.00 ACUTE CYSTITIS WITHOUT HEMATURIA: Primary | ICD-10-CM

## 2022-09-24 LAB
BACTERIA URNS QL MICRO: NEGATIVE /HPF
CASTS URNS QL MICRO: NORMAL /LPF
EPI CELLS #/AREA URNS HPF: NORMAL /HPF
RBC #/AREA URNS HPF: NORMAL /HPF
WBC URNS QL MICRO: NORMAL /HPF

## 2022-09-24 PROCEDURE — 99283 EMERGENCY DEPT VISIT LOW MDM: CPT

## 2022-09-24 PROCEDURE — 87086 URINE CULTURE/COLONY COUNT: CPT

## 2022-09-24 PROCEDURE — 81015 MICROSCOPIC EXAM OF URINE: CPT

## 2022-09-24 RX ORDER — NITROFURANTOIN 25; 75 MG/1; MG/1
100 CAPSULE ORAL 2 TIMES DAILY
Qty: 14 CAPSULE | Refills: 0 | Status: SHIPPED | OUTPATIENT
Start: 2022-09-24 | End: 2022-10-01

## 2022-09-24 ASSESSMENT — PAIN DESCRIPTION - LOCATION: LOCATION: VAGINA

## 2022-09-24 ASSESSMENT — ENCOUNTER SYMPTOMS
BACK PAIN: 0
SHORTNESS OF BREATH: 0
EYE PAIN: 0
ABDOMINAL PAIN: 0
NAUSEA: 0
SINUS PRESSURE: 0
DIARRHEA: 0
VOMITING: 0
SINUS PAIN: 0
ABDOMINAL DISTENTION: 0
CHEST TIGHTNESS: 0
COLOR CHANGE: 0

## 2022-09-24 ASSESSMENT — PAIN - FUNCTIONAL ASSESSMENT: PAIN_FUNCTIONAL_ASSESSMENT: 0-10

## 2022-09-24 ASSESSMENT — PAIN SCALES - GENERAL: PAINLEVEL_OUTOF10: 7

## 2022-09-24 ASSESSMENT — PAIN DESCRIPTION - DESCRIPTORS: DESCRIPTORS: BURNING;PRESSURE

## 2022-09-24 NOTE — Clinical Note
Naren Story was seen and treated in our emergency department on 9/24/2022. She may return to work on 09/26/2022. If you have any questions or concerns, please don't hesitate to call.       Latisha Frances MD

## 2022-09-24 NOTE — ED TRIAGE NOTES
Patient reports vaginal discomfort, pain in vagina when walking, and burning with urination x3 days. Pt states \"it feels like my vagina is stretching\". Pt reports mild lower abdominal cramping and denies vaginal bleeding. Pt is 15 weeks pregnant.

## 2022-09-24 NOTE — ED PROVIDER NOTES
Hermilo Cerda is a 34 y.o. female who presents to the Emergency Department with chief complaint of    Chief Complaint   Patient presents with    Groin Pain     Pt is 15 weeks pregnant. 79-year-old  15 week pregnant female presents with a sensation of vaginal stretching and dysuria onset 2 days ago. Occasionally has had the retching sensation previously around the first day of each week of pregnancy, reportedly worse with walking or standing. Denies any vaginal discharge, bleeding, or cramping. Reports that this is an uncomplicated pregnancy, states that she was treated for a yeast infection when she was 9 weeks pregnant. States that she last had an ultrasound around 3 weeks ago for the pregnancy. The history is provided by the patient. Dysuria   This is a new problem. The current episode started 2 days ago. The problem has been gradually worsening. The quality of the pain is described as burning. The pain is at a severity of 7/10. The pain is mild. There has been no fever. She is Sexually active. There is No history of pyelonephritis. Associated symptoms include possible pregnancy (15 weeks pregnant). Pertinent negatives include no chills, no sweats, no nausea, no vomiting, no discharge, no frequency, no hematuria, no hesitancy, no urgency and no flank pain. She has tried nothing for the symptoms. Her past medical history does not include recurrent UTIs. Review of Systems   Constitutional:  Negative for activity change, appetite change, chills, fatigue and fever. HENT:  Negative for congestion, sinus pressure and sinus pain. Eyes:  Negative for pain and visual disturbance. Respiratory:  Negative for chest tightness and shortness of breath. Cardiovascular:  Negative for chest pain, palpitations and leg swelling. Gastrointestinal:  Negative for abdominal distention, abdominal pain, diarrhea, nausea and vomiting.    Genitourinary:  Positive for dysuria, pelvic pain (Feeling of fullness) and vaginal pain. Negative for decreased urine volume, difficulty urinating, flank pain, frequency, hematuria, hesitancy, urgency, vaginal bleeding and vaginal discharge. Musculoskeletal:  Negative for back pain and joint swelling. Skin:  Negative for color change and rash. Neurological:  Negative for dizziness, seizures, weakness, numbness and headaches. Psychiatric/Behavioral:  Negative for agitation, behavioral problems and confusion. Past Medical History:   Diagnosis Date    Abnormal Papanicolaou smear of cervix     Dysthymia 2/3/2022        No past surgical history on file. Family History   Problem Relation Age of Onset    Breast Cancer Neg Hx     Colon Cancer Neg Hx     Ovarian Cancer Neg Hx     Uterine Cancer Neg Hx         Social History     Socioeconomic History    Marital status: Single   Tobacco Use    Smoking status: Never    Smokeless tobacco: Never   Substance and Sexual Activity    Alcohol use: No    Drug use: Not Currently   Social History Narrative    Abuse: Feels safe at home, no history of physical abuse, no history of sexual abuse          Penicillins     Previous Medications    PRENATAL VIT-FE FUMARATE-FA (PRENATAL PO)    Take by mouth        Vitals signs and nursing note reviewed. Patient Vitals for the past 4 hrs:   Temp Pulse Resp BP SpO2   09/24/22 1547 98.5 °F (36.9 °C) 96 16 131/81 99 %          Physical Exam  Vitals and nursing note reviewed. Constitutional:       General: She is not in acute distress. Appearance: Normal appearance. She is not ill-appearing, toxic-appearing or diaphoretic. HENT:      Head: Normocephalic and atraumatic. Right Ear: External ear normal.      Left Ear: External ear normal.      Nose: No congestion or rhinorrhea. Mouth/Throat:      Mouth: Mucous membranes are dry. Pharynx: Oropharynx is clear. Eyes:      Conjunctiva/sclera: Conjunctivae normal.      Pupils: Pupils are equal, round, and reactive to light. present. This note has not been completely proofread for errors.       Coni Gunn, APRN - CNP  09/24/22 Rica Moore, APRN - CNP  09/24/22 [id number]

## 2022-09-24 NOTE — ED NOTES
I have reviewed discharge instructions with the patient. The patient verbalized understanding. Patient left ED via Discharge Method: ambulatory to Home with self. Opportunity for questions and clarification provided. Patient given 1 scripts. To continue your aftercare when you leave the hospital, you may receive an automated call from our care team to check in on how you are doing. This is a free service and part of our promise to provide the best care and service to meet your aftercare needs.  If you have questions, or wish to unsubscribe from this service please call [phone]. Thank you for Choosing our [facility] Emergency Department.         Derian Gustafson RN  09/24/22 [id number]

## 2022-09-24 NOTE — DISCHARGE INSTRUCTIONS
Please complete prescription of Macrobid. Keep your appointment with OB on Thursday. Please return to the ER for fever, flank pain, any worsening or concerning symptoms.

## 2022-09-27 LAB
BACTERIA SPEC CULT: NORMAL
SERVICE CMNT-IMP: NORMAL

## 2022-09-29 ENCOUNTER — ROUTINE PRENATAL (OUTPATIENT)
Dept: OBGYN CLINIC | Age: 32
End: 2022-09-29
Payer: COMMERCIAL

## 2022-09-29 VITALS
HEIGHT: 67 IN | WEIGHT: 269 LBS | DIASTOLIC BLOOD PRESSURE: 70 MMHG | SYSTOLIC BLOOD PRESSURE: 118 MMHG | BODY MASS INDEX: 42.22 KG/M2

## 2022-09-29 DIAGNOSIS — Z34.82 MULTIGRAVIDA IN SECOND TRIMESTER: ICD-10-CM

## 2022-09-29 DIAGNOSIS — Z34.81 MULTIGRAVIDA IN FIRST TRIMESTER: ICD-10-CM

## 2022-09-29 DIAGNOSIS — O26.892 VAGINAL DISCHARGE DURING PREGNANCY, SECOND TRIMESTER: ICD-10-CM

## 2022-09-29 DIAGNOSIS — N89.8 VAGINAL DISCHARGE DURING PREGNANCY, SECOND TRIMESTER: ICD-10-CM

## 2022-09-29 DIAGNOSIS — Q51.28 SEPTATE UTERUS AFFECTING PREGNANCY IN SECOND TRIMESTER: Primary | ICD-10-CM

## 2022-09-29 DIAGNOSIS — O34.02 SEPTATE UTERUS AFFECTING PREGNANCY IN SECOND TRIMESTER: Primary | ICD-10-CM

## 2022-09-29 DIAGNOSIS — Z14.1 CYSTIC FIBROSIS CARRIER: ICD-10-CM

## 2022-09-29 PROCEDURE — 76817 TRANSVAGINAL US OBSTETRIC: CPT | Performed by: NURSE PRACTITIONER

## 2022-09-29 PROCEDURE — 99902 PR PRENATAL VISIT: CPT | Performed by: NURSE PRACTITIONER

## 2022-09-29 PROCEDURE — 76815 OB US LIMITED FETUS(S): CPT | Performed by: NURSE PRACTITIONER

## 2022-09-29 ASSESSMENT — PATIENT HEALTH QUESTIONNAIRE - PHQ9
SUM OF ALL RESPONSES TO PHQ QUESTIONS 1-9: 0
SUM OF ALL RESPONSES TO PHQ9 QUESTIONS 1 & 2: 0
1. LITTLE INTEREST OR PLEASURE IN DOING THINGS: 0
SUM OF ALL RESPONSES TO PHQ QUESTIONS 1-9: 0
2. FEELING DOWN, DEPRESSED OR HOPELESS: 0
SUM OF ALL RESPONSES TO PHQ QUESTIONS 1-9: 0
SUM OF ALL RESPONSES TO PHQ QUESTIONS 1-9: 0

## 2022-09-29 NOTE — PROGRESS NOTES
This is a 34 y.o.  [id number] at 16w0d for routine OB visit. Her Estimated Due Date is 3/16/2023, by Ultrasound    Denies leaking of fluid, vaginal bleeding, or regular contractions. Reports fetal movement. C/o vaginal discharge and itching. Started on macrobid 5 days ago by ER but still having symptoms    Current Outpatient Medications on File Prior to Visit   Medication Sig Dispense Refill    nitrofurantoin, macrocrystal-monohydrate, (MACROBID) 100 MG capsule Take 1 capsule by mouth 2 times daily for 7 days 14 capsule 0    Prenatal Vit-Fe Fumarate-FA (PRENATAL PO) Take by mouth       No current facility-administered medications on file prior to visit. Allergies   Allergen Reactions    Penicillins Other (See Comments)       OB History    Para Term  AB Living   4 2 2 0 1 2   SAB IAB Ectopic Molar Multiple Live Births   1 0 0 0 0 2       # 1 - Date: 09, Sex: None, Weight: None, GA: 38w0d, Delivery: Vaginal, Spontaneous, Apgar1: None, Apgar5: None, Living: Living, Birth Comments: None    # 2 - Date: 04/30/15, Sex: Male, Weight: None, GA: 38w0d, Delivery: Vaginal, Spontaneous, Apgar1: None, Apgar5: None, Living: Living, Birth Comments: None    # 3 - Date: 2016, Sex: None, Weight: None, GA: None, Delivery: None, Apgar1: None, Apgar5: None, Living: None, Birth Comments: None    # 4 - Date: None, Sex: None, Weight: None, GA: None, Delivery: None, Apgar1: None, Apgar5: None, Living: None, Birth Comments: None        Past Medical History:   Diagnosis Date    Abnormal Papanicolaou smear of cervix     Dysthymia 2/3/2022       History reviewed. No pertinent surgical history.     Family History   Problem Relation Age of Onset    Breast Cancer Neg Hx     Colon Cancer Neg Hx     Ovarian Cancer Neg Hx     Uterine Cancer Neg Hx        Social History     Socioeconomic History    Marital status: Single     Spouse name: Not on file    Number of children: Not on file    Years of education: Not on file    Highest education level: Not on file   Occupational History    Not on file   Tobacco Use    Smoking status: Never    Smokeless tobacco: Never   Substance and Sexual Activity    Alcohol use: No    Drug use: Not Currently    Sexual activity: Not on file   Other Topics Concern    Not on file   Social History Narrative    Abuse: Feels safe at home, no history of physical abuse, no history of sexual abuse      Social Determinants of Health     Financial Resource Strain: Not on file   Food Insecurity: Not on file   Transportation Needs: Not on file   Physical Activity: Not on file   Stress: Not on file   Social Connections: Not on file   Intimate Partner Violence: Not on file   Housing Stability: Not on file           Objective    Vitals:    09/29/22 0913   BP: 118/70   Site: Left Upper Arm   Position: Sitting   Weight: 269 lb (122 kg)   Height: 5' 7\" (1.702 m)       General: well developed, well nourished, in no acute distress    Head: normocephalic and atraumatic    Resp: even and unlabored    Pelvic Exam:       External: normal female genitalia without lesions or masses       Vagina: normal without lesions or masses, large amt of thick, yellow cottage cheese like discharge noted      Cervix: normal without lesions or masses       Psych: Normal mood and affect        Assessment and Plan      Patient Active Problem List    Diagnosis Date Noted    Vaginal discharge during pregnancy, second trimester 09/29/2022     Priority: Medium     Assessment & Plan Note:     Exam c/w yeast inf, Rx sent for terazol  Urine Cx negative, advised she can stop macrobid      Cystic fibrosis carrier 08/31/2022     Priority: Medium     Overview Note:     9/8/22:  ACOG info prev given to pt, D/W her FOB testing and/or genetic counseling -- pt declines      Multigravida in first trimester 08/09/2022     Priority: Medium     Overview Note:     EDC by 8 5/7 week US (not c/w LMP)    08/17/2022:  NIPT no result, low fetal fraction  SMA, DMD, and Fragile X carrier screening negative    2022: NIPT negx3, female       Assessment & Plan Note:     AFP today  PTL/labor precautions, [address], and pregnancy warning signs reviewed. Pt advised to call the office at [phone] or go straight to Labor and Delivery at [facility] with any of the following concerns vaginal bleeding, leaking of fluid, jason regularly Q 5-7 minutes for over an hour or not feeling the baby move. RTO 2 weeks CL only, 4 weeks OBV US and visit      Septate uterus affecting pregnancy in second trimester 2022     Priority: Medium     Overview Note:     2022: US today ? septate vs bicornuate uterus. . Pt denies previous dx of this or  any complications/PTL in previous pregnancies. Will recheck CL and reassess at 16 weeks    22: CL 5 cm      Cervical high risk HPV (human papillomavirus) test positive 2021     Overview Note:     On pap done 2020 in [place] and again 2021:  colpo neg           Problem List Items Addressed This Visit        Genitourinary    Septate uterus affecting pregnancy in second trimester - Primary    Relevant Orders    AMB POC US OB TRANSVAGINAL (Completed)    AMB POC US OB FETAL HEART, 1 OR MORE FETUSES (Completed)       Other    Cystic fibrosis carrier    Multigravida in first trimester     AFP today  PTL/labor precautions, [address], and pregnancy warning signs reviewed. Pt advised to call the office at [phone] or go straight to Labor and Delivery at [facility] with any of the following concerns vaginal bleeding, leaking of fluid, jason regularly Q 5-7 minutes for over an hour or not feeling the baby move.    RTO 2 weeks CL only, 4 weeks OBV US and visit         Vaginal discharge during pregnancy, second trimester     Exam c/w yeast inf, Rx sent for terazol  Urine Cx negative, advised she can stop macrobid         Relevant Orders    Nuswab Vaginitis Plus (VG+)   Other Visit Diagnoses     Multigravida in second trimester        Relevant Orders    AMB POC US OB TRANSVAGINAL (Completed)    AMB POC US OB FETAL HEART, 1 OR MORE FETUSES (Completed)    Alpha Fetoprotein, Maternal          Orders Placed This Encounter   Procedures    Nuswab Vaginitis Plus (VG+)    AMB POC US OB TRANSVAGINAL    AMB POC US OB FETAL HEART, 1 OR MORE FETUSES    Alpha Fetoprotein, Maternal       Outpatient Encounter Medications as of 9/29/2022   Medication Sig Dispense Refill    terconazole (TERAZOL 7) 0.4 % vaginal cream Place vaginally nightly. 45 g 0    nitrofurantoin, macrocrystal-monohydrate, (MACROBID) 100 MG capsule Take 1 capsule by mouth 2 times daily for 7 days 14 capsule 0    Prenatal Vit-Fe Fumarate-FA (PRENATAL PO) Take by mouth       No facility-administered encounter medications on file as of 9/29/2022.                Labor signs, pregnancy warning signs, and fetal movement counting reviewed (if applicable)        Aaron Gordillo NP, APRN - CNP 09/29/22 9:41 AM

## 2022-09-29 NOTE — ASSESSMENT & PLAN NOTE
AFP today  PTL/labor precautions, [address], and pregnancy warning signs reviewed. Pt advised to call the office at [phone] or go straight to Labor and Delivery at [facility] with any of the following concerns vaginal bleeding, leaking of fluid, jason regularly Q 5-7 minutes for over an hour or not feeling the baby move.    RTO 2 weeks CL only, 4 weeks OBV US and visit

## 2022-09-29 NOTE — PATIENT INSTRUCTIONS
Thank you for coming. Return to the office in 2 weeks. Please call if you have any abdominal pain and 5 contractions in 1 hour, vaginal bleeding or spotting, or leaking of fluid.

## 2022-09-29 NOTE — PROGRESS NOTES
Patient comes in today for routine prenatal visit. Patient states she is having vaginal itching and discharge like a yeast infection. Patient states she is still taking the macrobid but thinks that it is not working.      Fetal Movement: No  Contractions: No  Vaginal Bleeding: No  Leaking Fluid: No  GI/: No    Vitals:    09/29/22 0913   BP: 118/70   Site: Left Upper Arm   Position: Sitting   Weight: 269 lb (122 kg)   Height: 5' 7\" (1.702 m)

## 2022-10-04 LAB
AFP INTERP SERPL-IMP: NORMAL
AFP MOM SERPL: 1.29
AFP SERPL-MCNC: 32.1 NG/ML
AGE AT DELIVERY: 33.5 YR
COMMENT: NORMAL
GA METHOD: NORMAL
GA: 16 WEEKS
IDDM PATIENT QL: NO
Lab: NORMAL
MAT SCN FOR FETAL ABNORMALITIES SERPL: NORMAL
MULTIPLE PREGNANCY: NO
NEURAL TUBE DEFECT RISK FETUS: 4947 %

## 2022-10-12 ENCOUNTER — ROUTINE PRENATAL (OUTPATIENT)
Dept: OBGYN CLINIC | Age: 32
End: 2022-10-12
Payer: COMMERCIAL

## 2022-10-12 DIAGNOSIS — Z34.82 MULTIGRAVIDA IN SECOND TRIMESTER: ICD-10-CM

## 2022-10-12 DIAGNOSIS — Q51.28 SEPTATE UTERUS AFFECTING PREGNANCY IN SECOND TRIMESTER: Primary | ICD-10-CM

## 2022-10-12 DIAGNOSIS — O34.02 SEPTATE UTERUS AFFECTING PREGNANCY IN SECOND TRIMESTER: Primary | ICD-10-CM

## 2022-10-12 PROCEDURE — 76817 TRANSVAGINAL US OBSTETRIC: CPT | Performed by: NURSE PRACTITIONER

## 2022-10-12 PROCEDURE — 76815 OB US LIMITED FETUS(S): CPT | Performed by: NURSE PRACTITIONER

## 2022-10-27 ENCOUNTER — ROUTINE PRENATAL (OUTPATIENT)
Dept: OBGYN CLINIC | Age: 32
End: 2022-10-27
Payer: COMMERCIAL

## 2022-10-27 VITALS
WEIGHT: 274 LBS | HEIGHT: 67 IN | BODY MASS INDEX: 43 KG/M2 | SYSTOLIC BLOOD PRESSURE: 116 MMHG | DIASTOLIC BLOOD PRESSURE: 70 MMHG

## 2022-10-27 DIAGNOSIS — Z14.1 CYSTIC FIBROSIS CARRIER: ICD-10-CM

## 2022-10-27 DIAGNOSIS — O34.02 SEPTATE UTERUS AFFECTING PREGNANCY IN SECOND TRIMESTER: ICD-10-CM

## 2022-10-27 DIAGNOSIS — Q51.28 SEPTATE UTERUS AFFECTING PREGNANCY IN SECOND TRIMESTER: ICD-10-CM

## 2022-10-27 DIAGNOSIS — Z34.82 MULTIGRAVIDA IN SECOND TRIMESTER: ICD-10-CM

## 2022-10-27 DIAGNOSIS — Z36.89 ENCOUNTER FOR FETAL ANATOMIC SURVEY: Primary | ICD-10-CM

## 2022-10-27 DIAGNOSIS — R87.810 CERVICAL HIGH RISK HPV (HUMAN PAPILLOMAVIRUS) TEST POSITIVE: ICD-10-CM

## 2022-10-27 PROCEDURE — 99902 PR PRENATAL VISIT: CPT | Performed by: OBSTETRICS & GYNECOLOGY

## 2022-10-27 PROCEDURE — 76817 TRANSVAGINAL US OBSTETRIC: CPT | Performed by: OBSTETRICS & GYNECOLOGY

## 2022-10-27 PROCEDURE — 76805 OB US >/= 14 WKS SNGL FETUS: CPT | Performed by: OBSTETRICS & GYNECOLOGY

## 2022-10-27 NOTE — PROGRESS NOTES
Patient comes in today for routine prenatal visit. No complaints/concerns today.      Fetal Movement: Yes  Contractions: No  Vaginal Bleeding: No  Leaking Fluid: No  GI/: No    Vitals:    10/27/22 0948   BP: 116/70   Site: Left Upper Arm   Position: Sitting   Weight: 274 lb (124.3 kg)   Height: 5' 7\" (1.702 m)

## 2022-10-27 NOTE — PROGRESS NOTES
No chief complaint on file. This 34 y.o. [id number] at 20w0d with Estimated Date of Delivery: 3/16/23 presents for routine prenatal visit. Patient has no complaints today. Pt reports good FM, no LOF, VB, ctx. Pt denies H/A, vision changes, abdom pain, N/V. Vitals:    10/27/22 0948   BP: 116/70   Site: Left Upper Arm   Position: Sitting   Weight: 274 lb (124.3 kg)   Height: 5' 7\" (1.702 m)        Patient Active Problem List    Diagnosis Date Noted    Cystic fibrosis carrier 2022     Priority: Medium     Overview Note:     22:  ACOG info prev given to pt, D/W her FOB testing and/or genetic counseling -- pt declines       Assessment & Plan Note:     noted      Septate uterus affecting pregnancy in second trimester 2022     Priority: Medium     Overview Note:     2022: US today ? septate vs bicornuate uterus. . Pt denies previous dx of this or  any complications/PTL in previous pregnancies. Will recheck CL and reassess at 16 weeks    22: CL 5 cm  10/12/2022: CL 3.9 cm  10/27/22:  CL 4.35 cm, no funneling       Assessment & Plan Note:     noted      Multigravida in second trimester 2022     Overview Note:     EDC by 8 5/7 week US (not c/w LMP)    2022: NIPT no result, low fetal fraction. SMA, DMD, and Fragile X carrier screening negative  2022: NIPT negx3, female  22:  AFP only neg       Assessment & Plan Note:     Educated patient of signs and symptoms of  labor including but not limited to regular uterine contractions every 5-7 minutes for 1 hour, vaginal bleeding or leakage of fluid to seek immediate care.        Cervical high risk HPV (human papillomavirus) test positive 2021     Overview Note:     On pap done 2020 in [place] and again 2021:  colpo neg         Assessment & Plan Note:     noted        Problem List Items Addressed This Visit        Genitourinary    Septate uterus affecting pregnancy in second trimester noted         Relevant Orders    AMB POC US OB >= 14 WKS, 1ST GESTATION (Completed)    AMB POC US OB TRANSVAGINAL (Completed)       Other    Cystic fibrosis carrier     noted         Relevant Orders    AMB POC US OB >= 14 WKS, 1ST GESTATION (Completed)    AMB POC US OB TRANSVAGINAL (Completed)    Cervical high risk HPV (human papillomavirus) test positive     noted         Multigravida in second trimester     Educated patient of signs and symptoms of  labor including but not limited to regular uterine contractions every 5-7 minutes for 1 hour, vaginal bleeding or leakage of fluid to seek immediate care.           Relevant Orders    AMB POC US OB >= 14 WKS, 1ST GESTATION (Completed)    AMB POC US OB TRANSVAGINAL (Completed)   Other Visit Diagnoses     Encounter for fetal anatomic survey    -  Primary    Relevant Orders    AMB POC US OB >= 14 WKS, 1ST GESTATION (Completed)    AMB POC US OB TRANSVAGINAL (Completed)           Ulanda Landau, MD     9:57 AM    10/27/22

## 2022-11-29 ENCOUNTER — ROUTINE PRENATAL (OUTPATIENT)
Dept: OBGYN CLINIC | Age: 32
End: 2022-11-29
Payer: COMMERCIAL

## 2022-11-29 VITALS — WEIGHT: 281 LBS | SYSTOLIC BLOOD PRESSURE: 126 MMHG | DIASTOLIC BLOOD PRESSURE: 72 MMHG | BODY MASS INDEX: 44.01 KG/M2

## 2022-11-29 DIAGNOSIS — Q51.28 SEPTATE UTERUS AFFECTING PREGNANCY IN SECOND TRIMESTER: Primary | ICD-10-CM

## 2022-11-29 DIAGNOSIS — Z36.2 ENCOUNTER FOR FOLLOW-UP ULTRASOUND OF FETAL ANATOMY: ICD-10-CM

## 2022-11-29 DIAGNOSIS — Z14.1 CYSTIC FIBROSIS CARRIER: ICD-10-CM

## 2022-11-29 DIAGNOSIS — O34.02 SEPTATE UTERUS AFFECTING PREGNANCY IN SECOND TRIMESTER: Primary | ICD-10-CM

## 2022-11-29 DIAGNOSIS — Z34.82 MULTIGRAVIDA IN SECOND TRIMESTER: ICD-10-CM

## 2022-11-29 DIAGNOSIS — R87.810 CERVICAL HIGH RISK HPV (HUMAN PAPILLOMAVIRUS) TEST POSITIVE: ICD-10-CM

## 2022-11-29 PROCEDURE — 99902 PR PRENATAL VISIT: CPT | Performed by: OBSTETRICS & GYNECOLOGY

## 2022-11-29 PROCEDURE — 76817 TRANSVAGINAL US OBSTETRIC: CPT | Performed by: OBSTETRICS & GYNECOLOGY

## 2022-11-29 PROCEDURE — 76816 OB US FOLLOW-UP PER FETUS: CPT | Performed by: OBSTETRICS & GYNECOLOGY

## 2022-11-29 NOTE — PROGRESS NOTES
Patient is here today for routine OBV. No questions or concerns today.     Fetal Movements:  Yes  Contractions:  No  Vaginal Bleeding:  No  Leaking Fluid:  No}  GI/ issues:  No    Vitals:    11/29/22 0830   BP: 126/72   Site: Left Upper Arm   Position: Sitting   Weight: 281 lb (127.5 kg)       Sneha Soas NP, APRN - CNP  11/29/22  8:32 AM

## 2022-11-29 NOTE — PROGRESS NOTES
No chief complaint on file. This 34 y.o. [id number] at 24w5d with Estimated Date of Delivery: 3/16/23 presents for routine prenatal visit. Patient has no complaints today. Pt reports good FM, no LOF, VB, ctx. Pt denies H/A, vision changes, abdom pain, N/V. Vitals:    22 0830   BP: 126/72   Site: Left Upper Arm   Position: Sitting   Weight: 281 lb (127.5 kg)        Patient Active Problem List    Diagnosis Date Noted    Cystic fibrosis carrier 2022     Priority: Medium     Overview Note:     22:  ACOG info prev given to pt, D/W her FOB testing and/or genetic counseling -- pt declines       Assessment & Plan Note:     noted      Septate uterus affecting pregnancy in second trimester 2022     Priority: Medium     Overview Note:     2022: US today ? septate vs bicornuate uterus. . Pt denies previous dx of this or  any complications/PTL in previous pregnancies. Will recheck CL and reassess at 16 weeks    22: CL 5 cm  10/12/2022: CL 3.9 cm  10/27/22:  CL 4.35 cm, no funneling  22:  CL 4.7 cm, no funneling       Assessment & Plan Note:     noted      Multigravida in second trimester 2022     Overview Note:     EDC by 8 5/7 week US (not c/w LMP)    2022: NIPT no result, low fetal fraction. SMA, DMD, and Fragile X carrier screening negative  2022: NIPT negx3, female  22:  AFP only neg       Assessment & Plan Note:     Educated patient of signs and symptoms of  labor including but not limited to regular uterine contractions every 5-7 minutes for 1 hour, vaginal bleeding or leakage of fluid to seek immediate care.        Cervical high risk HPV (human papillomavirus) test positive 2021     Overview Note:     On pap done 2020 in [place] and again 2021:  colpo neg         Assessment & Plan Note:     noted        Problem List Items Addressed This Visit        Genitourinary    Septate uterus affecting pregnancy in second trimester - Primary     noted         Relevant Orders    AMB POC US OB RE-EVAL/FOLLOW UP    AMB POC US OB TRANSVAGINAL       Other    Cystic fibrosis carrier     noted         Relevant Orders    AMB POC US OB RE-EVAL/FOLLOW UP    Cervical high risk HPV (human papillomavirus) test positive     noted         Multigravida in second trimester     Educated patient of signs and symptoms of  labor including but not limited to regular uterine contractions every 5-7 minutes for 1 hour, vaginal bleeding or leakage of fluid to seek immediate care.           Relevant Orders    AMB POC US OB RE-EVAL/FOLLOW UP    AMB POC US OB TRANSVAGINAL   Other Visit Diagnoses     Encounter for follow-up ultrasound of fetal anatomy        Relevant Orders    AMB POC US OB RE-EVAL/FOLLOW UP           Fredy aMrcus MD     8:35 AM    22

## 2022-12-22 ENCOUNTER — ROUTINE PRENATAL (OUTPATIENT)
Dept: OBGYN CLINIC | Age: 32
End: 2022-12-22

## 2022-12-22 VITALS
BODY MASS INDEX: 43.95 KG/M2 | WEIGHT: 280 LBS | HEIGHT: 67 IN | SYSTOLIC BLOOD PRESSURE: 122 MMHG | DIASTOLIC BLOOD PRESSURE: 84 MMHG

## 2022-12-22 DIAGNOSIS — N89.8 VAGINAL DISCHARGE DURING PREGNANCY IN THIRD TRIMESTER: ICD-10-CM

## 2022-12-22 DIAGNOSIS — O34.03 SEPTATE UTERUS AFFECTING PREGNANCY IN THIRD TRIMESTER: Primary | ICD-10-CM

## 2022-12-22 DIAGNOSIS — Z34.83 MULTIGRAVIDA IN THIRD TRIMESTER: ICD-10-CM

## 2022-12-22 DIAGNOSIS — Q51.28 SEPTATE UTERUS AFFECTING PREGNANCY IN THIRD TRIMESTER: Primary | ICD-10-CM

## 2022-12-22 DIAGNOSIS — O26.843 UTERINE SIZE DATE DISCREPANCY PREGNANCY, THIRD TRIMESTER: ICD-10-CM

## 2022-12-22 DIAGNOSIS — Z14.1 CYSTIC FIBROSIS CARRIER: ICD-10-CM

## 2022-12-22 DIAGNOSIS — O26.893 VAGINAL DISCHARGE DURING PREGNANCY IN THIRD TRIMESTER: ICD-10-CM

## 2022-12-22 LAB
ERYTHROCYTE [DISTWIDTH] IN BLOOD BY AUTOMATED COUNT: 13.3 % (ref 11.9–14.6)
GLUCOSE 1 HOUR: 131 MG/DL
HCT VFR BLD AUTO: 30.9 % (ref 35.8–46.3)
HGB BLD-MCNC: 9.9 G/DL (ref 11.7–15.4)
MCH RBC QN AUTO: 27.6 PG (ref 26.1–32.9)
MCHC RBC AUTO-ENTMCNC: 32 G/DL (ref 31.4–35)
MCV RBC AUTO: 86.1 FL (ref 82–102)
NRBC # BLD: 0 K/UL (ref 0–0.2)
PLATELET # BLD AUTO: 367 K/UL (ref 150–450)
PMV BLD AUTO: 10.1 FL (ref 9.4–12.3)
RBC # BLD AUTO: 3.59 M/UL (ref 4.05–5.2)
WBC # BLD AUTO: 9.1 K/UL (ref 4.3–11.1)

## 2022-12-22 PROCEDURE — 99902 PR PRENATAL VISIT: CPT | Performed by: NURSE PRACTITIONER

## 2022-12-22 NOTE — ASSESSMENT & PLAN NOTE
PTL/labor precautions, [address], and pregnancy warning signs reviewed. Pt advised to call the office at [phone] or go straight to Labor and Delivery at [facility] with any of the following concerns vaginal bleeding, leaking of fluid, jason regularly Q 5-7 minutes for over an hour or not feeling the baby move.    RTO 2 weeks OBV, US, tdap

## 2022-12-22 NOTE — PROGRESS NOTES
This is a 34 y.o.  [id number] at 28w0d for routine OB visit. Her Estimated Due Date is 3/16/2023, by Ultrasound    Denies leaking of fluid, vaginal bleeding, or regular contractions. Reports fetal movement. Pt c/o discharge and itching, no odor    Current Outpatient Medications on File Prior to Visit   Medication Sig Dispense Refill    Prenatal Vit-Fe Fumarate-FA (PRENATAL PO) Take by mouth       No current facility-administered medications on file prior to visit. Allergies   Allergen Reactions    Penicillins Anaphylaxis       OB History    Para Term  AB Living   4 2 2 0 1 2   SAB IAB Ectopic Molar Multiple Live Births   1 0 0 0 0 2       # 1 - Date: 09, Sex: None, Weight: None, GA: 38w0d, Delivery: Vaginal, Spontaneous, Apgar1: None, Apgar5: None, Living: Living, Birth Comments: None    # 2 - Date: 04/30/15, Sex: Male, Weight: None, GA: 38w0d, Delivery: Vaginal, Spontaneous, Apgar1: None, Apgar5: None, Living: Living, Birth Comments: None    # 3 - Date: 2016, Sex: None, Weight: None, GA: None, Delivery: None, Apgar1: None, Apgar5: None, Living: None, Birth Comments: None    # 4 - Date: None, Sex: None, Weight: None, GA: None, Delivery: None, Apgar1: None, Apgar5: None, Living: None, Birth Comments: None        Past Medical History:   Diagnosis Date    Abnormal Papanicolaou smear of cervix     Dysthymia 2/3/2022       History reviewed. No pertinent surgical history.     Family History   Problem Relation Age of Onset    Breast Cancer Neg Hx     Colon Cancer Neg Hx     Ovarian Cancer Neg Hx     Uterine Cancer Neg Hx        Social History     Socioeconomic History    Marital status: Single     Spouse name: Not on file    Number of children: Not on file    Years of education: Not on file    Highest education level: Not on file   Occupational History    Not on file   Tobacco Use    Smoking status: Never    Smokeless tobacco: Never   Substance and Sexual Activity    Alcohol use: No    Drug use: Not Currently    Sexual activity: Not on file   Other Topics Concern    Not on file   Social History Narrative    Abuse: Feels safe at home, no history of physical abuse, no history of sexual abuse      Social Determinants of Health     Financial Resource Strain: Not on file   Food Insecurity: Not on file   Transportation Needs: Not on file   Physical Activity: Not on file   Stress: Not on file   Social Connections: Not on file   Intimate Partner Violence: Not on file   Housing Stability: Not on file           Objective    Vitals:    12/22/22 0849   BP: 122/84   Site: Left Upper Arm   Position: Sitting   Weight: 280 lb (127 kg)   Height: 5' 7\" (1.702 m)       General: well developed, well nourished, in no acute distress    Head: normocephalic and atraumatic    Resp: even and unlabored    Pelvic Exam:       External: normal female genitalia without lesions or masses       Vagina: normal without lesions or masses, large amt thick/white discharge noted      Cervix: normal without lesions or masses         Psych: Normal mood and affect        Assessment and Plan      Patient Active Problem List    Diagnosis Date Noted    Uterine size date discrepancy pregnancy, third trimester 12/22/2022     Priority: Medium     Overview Note:     FH 35cm @ 28 weeks, US next visit      Vaginal discharge during pregnancy in third trimester 12/22/2022     Priority: Medium     Assessment & Plan Note:     Exam c/w yeast, terconazole rx sent      Cystic fibrosis carrier 08/31/2022     Priority: Medium     Overview Note:     9/8/22:  ACOG info prev given to pt, D/W her FOB testing and/or genetic counseling -- pt declines       Assessment & Plan Note:     noted      Multigravida in third trimester 08/09/2022     Priority: Medium     Overview Note:     EDC by 8 5/7 week US (not c/w LMP)    08/17/2022: NIPT no result, low fetal fraction. SMA, DMD, and Fragile X carrier screening negative  08/31/2022:  NIPT negx3, female  22:  AFP only neg       Assessment & Plan Note:     PTL/labor precautions, [address], and pregnancy warning signs reviewed. Pt advised to call the office at [phone] or go straight to Labor and Delivery at [facility] with any of the following concerns vaginal bleeding, leaking of fluid, jason regularly Q 5-7 minutes for over an hour or not feeling the baby move. RTO 2 weeks OBV, US, tdap      Septate uterus affecting pregnancy in third trimester 2022     Priority: Medium     Overview Note:     2022: US today ? septate vs bicornuate uterus. . Pt denies previous dx of this or  any complications/PTL in previous pregnancies. Will recheck CL and reassess at 16 weeks    22: CL 5 cm  10/12/2022: CL 3.9 cm  10/27/22:  CL 4.35 cm, no funneling  22:  CL 4.7 cm, no funneling       Assessment & Plan Note:     noted      Cervical high risk HPV (human papillomavirus) test positive 2021     Overview Note:     On pap done 2020 in [place] and again 2021:  colpo neg           Problem List Items Addressed This Visit        Genitourinary    Septate uterus affecting pregnancy in third trimester - Primary     noted            Other    Cystic fibrosis carrier     noted         Multigravida in third trimester     PTL/labor precautions, [address], and pregnancy warning signs reviewed. Pt advised to call the office at [phone] or go straight to Labor and Delivery at [facility] with any of the following concerns vaginal bleeding, leaking of fluid, jason regularly Q 5-7 minutes for over an hour or not feeling the baby move.    RTO 2 weeks OBV, US, tdap         Relevant Orders    Glucose tolerance, 1 hour    CBC    Uterine size date discrepancy pregnancy, third trimester    Vaginal discharge during pregnancy in third trimester     Exam c/w yeast, terconazole rx sent         Relevant Orders    Nuswab Vaginitis Plus (VG+)       Orders Placed This Encounter   Procedures    Nuswab Vaginitis Plus (VG+)    Glucose tolerance, 1 hour    CBC       Outpatient Encounter Medications as of 12/22/2022   Medication Sig Dispense Refill    terconazole (TERAZOL 7) 0.4 % vaginal cream Place vaginally nightly. 45 g 0    Prenatal Vit-Fe Fumarate-FA (PRENATAL PO) Take by mouth       No facility-administered encounter medications on file as of 12/22/2022.                Labor signs, pregnancy warning signs, and fetal movement counting reviewed (if applicable)        Omer Preciado NP, APRN - CNP 12/22/22 9:18 AM

## 2022-12-22 NOTE — PATIENT INSTRUCTIONS
Thank you for coming. Return to the office in 2 weeks. Please call if you have any abdominal pain and 5 contractions in 1 hour, vaginal bleeding or spotting, or leaking of fluid. You should feel the baby move 10 times in an hour. Monitor this once a day. If you do not feel the baby move this often please call. Please call immediately if you have a headache that won't go away with tylenol, changes to your vision like funny lights or blurriness, nausea or vomiting, upper abdominal pain, or if the baby does not move at least 10 times in 2 hours.

## 2022-12-22 NOTE — PROGRESS NOTES
Patient comes in today for routine prenatal visit. Pt reports vaginal irritation and thick white discharge, believes she has a yeast infection.      Finished Glucola: 8:50AM     Fetal Movement: Yes  Contractions: Yes BH   Vaginal Bleeding: No  Leaking Fluid: No  GI/: No    Vitals:    12/22/22 0849   BP: 122/84   Site: Left Upper Arm   Position: Sitting   Weight: 280 lb (127 kg)   Height: 5' 7\" (1.702 m)

## 2022-12-23 DIAGNOSIS — O99.013 ANEMIA AFFECTING PREGNANCY IN THIRD TRIMESTER: ICD-10-CM

## 2022-12-23 DIAGNOSIS — O99.810 ABNORMAL GLUCOSE AFFECTING PREGNANCY: ICD-10-CM

## 2022-12-23 NOTE — TELEPHONE ENCOUNTER
Patient has failed 1 hr Glucola. Needs to be scheduled for 3hr GTT. 2. Please call patient and let her know that her hemoglobin was low. She needs to start taking FeSO4 325mg PO BID. Also encourage foods that are high in iron. Patient can take Colace 100mg PO BID prn for constipation. Encourage an increase in fluids and foods high in fiber to prevent constipation.

## 2022-12-25 LAB
A VAGINAE DNA VAG QL NAA+PROBE: NORMAL SCORE
BVAB2 DNA VAG QL NAA+PROBE: NORMAL SCORE
C TRACH RRNA SPEC QL NAA+PROBE: NEGATIVE
MEGA1 DNA VAG QL NAA+PROBE: NORMAL SCORE
N GONORRHOEA RRNA SPEC QL NAA+PROBE: NEGATIVE
SPECIMEN SOURCE: NORMAL
T VAGINALIS RRNA SPEC QL NAA+PROBE: NEGATIVE

## 2022-12-27 NOTE — PROGRESS NOTES
I have reviewed the patient's visit today including history, exam and assessment by CORTNEY Villegas. I agree with treatment/plan as above.     Carolyn Toth MD  7:19 PM  12/26/22

## 2022-12-28 NOTE — TELEPHONE ENCOUNTER
Called pt, message below reviewed with pt. Pt voiced understanding. 3hr gtt scheduled and diet sent.      RX pend

## 2022-12-29 RX ORDER — FERROUS SULFATE 325(65) MG
325 TABLET ORAL 2 TIMES DAILY
Qty: 60 TABLET | Refills: 5 | Status: SHIPPED | OUTPATIENT
Start: 2022-12-29

## 2023-01-06 ENCOUNTER — ROUTINE PRENATAL (OUTPATIENT)
Dept: OBGYN CLINIC | Age: 33
End: 2023-01-06

## 2023-01-06 VITALS
SYSTOLIC BLOOD PRESSURE: 130 MMHG | BODY MASS INDEX: 44.42 KG/M2 | HEIGHT: 67 IN | DIASTOLIC BLOOD PRESSURE: 84 MMHG | WEIGHT: 283 LBS

## 2023-01-06 DIAGNOSIS — Z34.83 MULTIGRAVIDA IN THIRD TRIMESTER: Primary | ICD-10-CM

## 2023-01-06 DIAGNOSIS — Q51.28 SEPTATE UTERUS AFFECTING PREGNANCY IN THIRD TRIMESTER: ICD-10-CM

## 2023-01-06 DIAGNOSIS — O26.843 UTERINE SIZE DATE DISCREPANCY PREGNANCY, THIRD TRIMESTER: ICD-10-CM

## 2023-01-06 DIAGNOSIS — O99.810 ABNORMAL GLUCOSE AFFECTING PREGNANCY: ICD-10-CM

## 2023-01-06 DIAGNOSIS — O34.03 SEPTATE UTERUS AFFECTING PREGNANCY IN THIRD TRIMESTER: ICD-10-CM

## 2023-01-06 DIAGNOSIS — O99.013 ANEMIA AFFECTING PREGNANCY IN THIRD TRIMESTER: ICD-10-CM

## 2023-01-06 ASSESSMENT — PATIENT HEALTH QUESTIONNAIRE - PHQ9
6. FEELING BAD ABOUT YOURSELF - OR THAT YOU ARE A FAILURE OR HAVE LET YOURSELF OR YOUR FAMILY DOWN: 0
10. IF YOU CHECKED OFF ANY PROBLEMS, HOW DIFFICULT HAVE THESE PROBLEMS MADE IT FOR YOU TO DO YOUR WORK, TAKE CARE OF THINGS AT HOME, OR GET ALONG WITH OTHER PEOPLE: 0
8. MOVING OR SPEAKING SO SLOWLY THAT OTHER PEOPLE COULD HAVE NOTICED. OR THE OPPOSITE, BEING SO FIGETY OR RESTLESS THAT YOU HAVE BEEN MOVING AROUND A LOT MORE THAN USUAL: 0
SUM OF ALL RESPONSES TO PHQ QUESTIONS 1-9: 0
SUM OF ALL RESPONSES TO PHQ QUESTIONS 1-9: 0
3. TROUBLE FALLING OR STAYING ASLEEP: 0
5. POOR APPETITE OR OVEREATING: 0
4. FEELING TIRED OR HAVING LITTLE ENERGY: 0
9. THOUGHTS THAT YOU WOULD BE BETTER OFF DEAD, OR OF HURTING YOURSELF: 0
1. LITTLE INTEREST OR PLEASURE IN DOING THINGS: 0
SUM OF ALL RESPONSES TO PHQ QUESTIONS 1-9: 0
2. FEELING DOWN, DEPRESSED OR HOPELESS: 0
SUM OF ALL RESPONSES TO PHQ QUESTIONS 1-9: 0
SUM OF ALL RESPONSES TO PHQ9 QUESTIONS 1 & 2: 0
7. TROUBLE CONCENTRATING ON THINGS, SUCH AS READING THE NEWSPAPER OR WATCHING TELEVISION: 0

## 2023-01-06 NOTE — PROGRESS NOTES
Patient comes in today for routine prenatal visit. Verbalizes wanting tdap after info sheet given. Tdap given. Patient states minimal spotting on Sunday, one time occurrence.      Fetal Movement: Yes  Contractions: Yes-BH  Vaginal Bleeding: Yes  Leaking Fluid: No  GI/: No    Vitals:    01/06/23 1024   BP: 130/84   Site: Left Upper Arm   Position: Sitting   Weight: 283 lb (128.4 kg)   Height: 5' 7\" (1.702 m)

## 2023-01-06 NOTE — PROGRESS NOTES
Chief Complaint   Patient presents with    Routine Prenatal Visit    Ultrasound        This 34 y.o. [id number] at 30w1d with Estimated Date of Delivery: 3/16/23 presents for routine prenatal visit. Patient has no complaints today. Pt reports good FM, no LOF, VB, ctx. Pt denies H/A, vision changes, abdom pain, N/V. Vitals:    23 1024   BP: 130/84   Site: Left Upper Arm   Position: Sitting   Weight: 283 lb (128.4 kg)   Height: 5' 7\" (1.702 m)        Patient Active Problem List    Diagnosis Date Noted    Cystic fibrosis carrier 2022     Priority: Medium     Overview Note:     22:  ACOG info prev given to pt, D/W her FOB testing and/or genetic counseling -- pt declines      Septate uterus affecting pregnancy in third trimester 2022     Priority: Medium     Overview Note:     2022: US today ? septate vs bicornuate uterus. . Pt denies previous dx of this or  any complications/PTL in previous pregnancies. Will recheck CL and reassess at 16 weeks    22: CL 5 cm  10/12/2022: CL 3.9 cm  10/27/22:  CL 4.35 cm, no funneling  22:  CL 4.7 cm, no funneling  23:  EFW 47%, AC 43%, SHA 22.0 cm, vtx       Assessment & Plan Note:     noted      Anemia affecting pregnancy in third trimester 2022     Overview Note:     Add'l Iron       Assessment & Plan Note:     noted      Abnormal glucose affecting pregnancy 2022     Overview Note:     Failed 1 hr glucola, 3 hr pending       Assessment & Plan Note:     noted      Uterine size date discrepancy pregnancy, third trimester 2022     Overview Note:     FH 35cm @ 28 weeks, US next visit    23:  EFW 47%, AC 43%, SHA 22.0 cm, vtx       Assessment & Plan Note:     noted      Multigravida in third trimester 2022     Overview Note:     EDC by 8 5/7 week US (not c/w LMP)    2022: NIPT no result, low fetal fraction. SMA, DMD, and Fragile X carrier screening negative  2022:  NIPT negx3, female  22:  AFP only neg       Assessment & Plan Note:     Educated patient of signs and symptoms of  labor including but not limited to regular uterine contractions every 5-7 minutes for 1 hour, vaginal bleeding or leakage of fluid to seek immediate care.  Cervical high risk HPV (human papillomavirus) test positive 2021     Overview Note:     On pap done 2020 in [place] and again 2021:  colpo neg          Problem List Items Addressed This Visit        Genitourinary    Septate uterus affecting pregnancy in third trimester     noted         Relevant Orders    AMB POC US OB RE-EVAL/FOLLOW UP (Completed)       Other    Multigravida in third trimester - Primary     Educated patient of signs and symptoms of  labor including but not limited to regular uterine contractions every 5-7 minutes for 1 hour, vaginal bleeding or leakage of fluid to seek immediate care.           Relevant Orders    AMB POC US OB RE-EVAL/FOLLOW UP (Completed)    TX IM ADM PRQ ID SUBQ/IM NJXS 1 VACCINE    Tdap, BOOSTRIX, (age 8 yrs+), IM (Completed)    Uterine size date discrepancy pregnancy, third trimester     noted         Relevant Orders    AMB POC US OB RE-EVAL/FOLLOW UP (Completed)    Anemia affecting pregnancy in third trimester     noted         Abnormal glucose affecting pregnancy     noted         Relevant Orders    AMB POC US OB RE-EVAL/FOLLOW UP (Completed)        Talia Schroeder MD     10:44 AM    23

## 2023-01-10 DIAGNOSIS — O99.810 ABNORMAL GLUCOSE AFFECTING PREGNANCY: Primary | ICD-10-CM

## 2023-01-10 DIAGNOSIS — O99.810 ABNORMAL GLUCOSE AFFECTING PREGNANCY: ICD-10-CM

## 2023-01-11 ENCOUNTER — TELEPHONE (OUTPATIENT)
Dept: OBGYN CLINIC | Age: 33
End: 2023-01-11

## 2023-01-11 DIAGNOSIS — O24.410 DIET CONTROLLED GESTATIONAL DIABETES MELLITUS (GDM) IN THIRD TRIMESTER: Primary | ICD-10-CM

## 2023-01-11 LAB
GESTATIONAL 3HR GTT: ABNORMAL
GLUCOSE 1H P 100 G GLC PO SERPL-MCNC: 201 MG/DL (ref 65–180)
GLUCOSE 2 HOUR: 167 MG/DL (ref 65–155)
GLUCOSE P FAST SERPL-MCNC: 86 MG/DL (ref 65–95)
GLUCOSE, 3 HOUR: 61 MG/DL (ref 65–140)

## 2023-01-11 RX ORDER — LANCETS 30 GAUGE
1 EACH MISCELLANEOUS 4 TIMES DAILY
Qty: 200 EACH | Refills: 0 | Status: SHIPPED | OUTPATIENT
Start: 2023-01-11

## 2023-01-11 RX ORDER — GLUCOSAMINE HCL/CHONDROITIN SU 500-400 MG
1 CAPSULE ORAL 4 TIMES DAILY
Qty: 200 STRIP | Refills: 11 | Status: SHIPPED | OUTPATIENT
Start: 2023-01-11

## 2023-01-11 RX ORDER — BLOOD-GLUCOSE METER
1 KIT MISCELLANEOUS DAILY
Qty: 1 KIT | Refills: 0 | Status: SHIPPED | OUTPATIENT
Start: 2023-01-11

## 2023-01-11 NOTE — TELEPHONE ENCOUNTER
RN called patient, patient verified PHIs, RN updated patient on failing their 3 hr GTT and educated patient on requirements from here on out. BSG log  BSG monitoring   Diabetes education  S/S to look out for for high and low BSG    Patient verbalized understanding.

## 2023-01-11 NOTE — TELEPHONE ENCOUNTER
Patient did not pass 3hr GTT. New diagnosis of Gestational Diabetes. 1.) Pt needs to be scheduled for Diabetes Education at [facility]. I pended the order for the referral. Please sign after talking to patient. They should call her soon to schedule. 2.) Pt needs to start checking blood sugars four times daily. Fasting and 1 hours after breakfast, lunch and dinner. A prescription for blood glucose meter, strips and lancets needs to be sent to the pharmacy. Glucometer   Glucose Test Strips Use four times daily to check blood sugars Disp: 100 RF: 6   Glucose Lancets Use four times daily to check blood sugars Disp: 100 RF:6    3.) Pt needs to write down her blood sugars and bring them to each visit here.

## 2023-01-12 DIAGNOSIS — O24.410 DIET CONTROLLED GESTATIONAL DIABETES MELLITUS (GDM) IN THIRD TRIMESTER: Primary | ICD-10-CM

## 2023-01-19 ENCOUNTER — ROUTINE PRENATAL (OUTPATIENT)
Dept: OBGYN CLINIC | Age: 33
End: 2023-01-19

## 2023-01-19 VITALS
HEIGHT: 67 IN | SYSTOLIC BLOOD PRESSURE: 128 MMHG | WEIGHT: 286 LBS | BODY MASS INDEX: 44.89 KG/M2 | DIASTOLIC BLOOD PRESSURE: 68 MMHG

## 2023-01-19 DIAGNOSIS — O99.013 ANEMIA AFFECTING PREGNANCY IN THIRD TRIMESTER: ICD-10-CM

## 2023-01-19 DIAGNOSIS — O34.03 SEPTATE UTERUS AFFECTING PREGNANCY IN THIRD TRIMESTER: Primary | ICD-10-CM

## 2023-01-19 DIAGNOSIS — O24.410 DIET CONTROLLED GESTATIONAL DIABETES MELLITUS (GDM) IN THIRD TRIMESTER: ICD-10-CM

## 2023-01-19 DIAGNOSIS — Q51.28 SEPTATE UTERUS AFFECTING PREGNANCY IN THIRD TRIMESTER: Primary | ICD-10-CM

## 2023-01-19 DIAGNOSIS — Z14.1 CYSTIC FIBROSIS CARRIER: ICD-10-CM

## 2023-01-19 DIAGNOSIS — Z34.83 MULTIGRAVIDA IN THIRD TRIMESTER: ICD-10-CM

## 2023-01-19 RX ORDER — BLOOD-GLUCOSE METER
EACH MISCELLANEOUS
Qty: 1 KIT | Refills: 0 | Status: SHIPPED | OUTPATIENT
Start: 2023-01-19

## 2023-01-19 ASSESSMENT — PATIENT HEALTH QUESTIONNAIRE - PHQ9
SUM OF ALL RESPONSES TO PHQ QUESTIONS 1-9: 0
SUM OF ALL RESPONSES TO PHQ9 QUESTIONS 1 & 2: 0
2. FEELING DOWN, DEPRESSED OR HOPELESS: 0
1. LITTLE INTEREST OR PLEASURE IN DOING THINGS: 0
SUM OF ALL RESPONSES TO PHQ QUESTIONS 1-9: 0

## 2023-01-19 NOTE — PROGRESS NOTES
Patient comes in today for routine prenatal visit. No complaints/concerns today.      Fetal Movement: Yes  Contractions: Yes, irregular  Vaginal Bleeding: No  Leaking Fluid: No  GI/: Yes, stomach pain    Vitals:    01/19/23 1017   BP: 128/68   Site: Left Upper Arm   Position: Sitting   Weight: 286 lb (129.7 kg)   Height: 5' 7\" (1.702 m)

## 2023-01-19 NOTE — PROGRESS NOTES
This is a 34 y.o.  [id number] at 32w0d for routine OB visit. Her Estimated Due Date is 3/16/2023, by Ultrasound    Denies leaking of fluid, vaginal bleeding, or regular contractions. Reports fetal movement. Pt does reports [address] ctxs yesterday. Also thinks she might have had upset stomach from meal. Feeling better today    Current Outpatient Medications on File Prior to Visit   Medication Sig Dispense Refill    ferrous sulfate (IRON 325) 325 (65 Fe) MG tablet Take 1 tablet by mouth 2 times daily 60 tablet 5    Prenatal Vit-Fe Fumarate-FA (PRENATAL PO) Take by mouth      glucose monitoring (FREESTYLE FREEDOM) kit 1 kit by Does not apply route daily (Patient not taking: Reported on 2023) 1 kit 0    blood glucose monitor strips 1 strip by Other route 4 times daily Use four times daily and as needed to check blood sugar (Patient not taking: Reported on 2023) 200 strip 11    Lancets MISC 1 each by Does not apply route 4 times daily (Patient not taking: Reported on 2023) 200 each 0     No current facility-administered medications on file prior to visit.        Allergies   Allergen Reactions    Penicillins Anaphylaxis       OB History    Para Term  AB Living   4 2 2 0 1 2   SAB IAB Ectopic Molar Multiple Live Births   1 0 0 0 0 2       # 1 - Date: 09, Sex: None, Weight: None, GA: 38w0d, Delivery: Vaginal, Spontaneous, Apgar1: None, Apgar5: None, Living: Living, Birth Comments: None    # 2 - Date: 04/30/15, Sex: Male, Weight: None, GA: 38w0d, Delivery: Vaginal, Spontaneous, Apgar1: None, Apgar5: None, Living: Living, Birth Comments: None    # 3 - Date: 2016, Sex: None, Weight: None, GA: None, Delivery: None, Apgar1: None, Apgar5: None, Living: None, Birth Comments: None    # 4 - Date: None, Sex: None, Weight: None, GA: None, Delivery: None, Apgar1: None, Apgar5: None, Living: None, Birth Comments: None        Past Medical History:   Diagnosis Date    Abnormal Papanicolaou smear of cervix     Dysthymia 2/3/2022       History reviewed. No pertinent surgical history.     Family History   Problem Relation Age of Onset    Breast Cancer Neg Hx     Colon Cancer Neg Hx     Ovarian Cancer Neg Hx     Uterine Cancer Neg Hx        Social History     Socioeconomic History    Marital status: Single     Spouse name: Not on file    Number of children: Not on file    Years of education: Not on file    Highest education level: Not on file   Occupational History    Not on file   Tobacco Use    Smoking status: Never    Smokeless tobacco: Never   Substance and Sexual Activity    Alcohol use: No    Drug use: Not Currently    Sexual activity: Yes     Birth control/protection: None   Other Topics Concern    Not on file   Social History Narrative    Abuse: Feels safe at home, no history of physical abuse, no history of sexual abuse      Social Determinants of Health     Financial Resource Strain: Not on file   Food Insecurity: Not on file   Transportation Needs: Not on file   Physical Activity: Not on file   Stress: Not on file   Social Connections: Not on file   Intimate Partner Violence: Not on file   Housing Stability: Not on file           Objective    Vitals:    01/19/23 1017   BP: 128/68   Site: Left Upper Arm   Position: Sitting   Weight: 286 lb (129.7 kg)   Height: 5' 7\" (1.702 m)       General: well developed, well nourished, in no acute distress    Head: normocephalic and atraumatic    Resp: even and unlabored    Psych: Normal mood and affect        Assessment and Plan      Patient Active Problem List    Diagnosis Date Noted    Anemia affecting pregnancy in third trimester 12/23/2022     Priority: Medium     Overview Note:     Add'l Iron       Assessment & Plan Note:     noted      Diet controlled gestational diabetes mellitus (GDM) in third trimester 12/23/2022     Priority: Medium     Overview Note:     Failed 1 hr glucola, 3 hr pending    1/19/2023: pt did not bring BS logs, states rx was not covered. New rx sent and pt advised to call office ASAP if not covered       Assessment & Plan Note:     noted      Uterine size date discrepancy pregnancy, third trimester 2022     Priority: Medium     Overview Note:     FH 35cm @ 28 weeks, US next visit    23:  EFW 47%, AC 43%, SHA 22.0 cm, vtx      Cystic fibrosis carrier 2022     Priority: Medium     Overview Note:     22:  ACOG info prev given to pt, D/W her FOB testing and/or genetic counseling -- pt declines      Multigravida in third trimester 2022     Priority: Medium     Overview Note:     EDC by 8 5/7 week US (not c/w LMP)    2022: NIPT no result, low fetal fraction. SMA, DMD, and Fragile X carrier screening negative  2022: NIPT negx3, female  22:  AFP only neg  2023: tdap given       Assessment & Plan Note:     PTL/labor precautions, [address], and pregnancy warning signs reviewed. Pt advised to call the office at [phone] or go straight to Labor and Delivery at [facility] with any of the following concerns vaginal bleeding, leaking of fluid, jason regularly Q 5-7 minutes for over an hour or not feeling the baby move. RTO 1 wk OBV      Septate uterus affecting pregnancy in third trimester 2022     Priority: Medium     Overview Note:     2022: US today ? septate vs bicornuate uterus. . Pt denies previous dx of this or  any complications/PTL in previous pregnancies.  Will recheck CL and reassess at 16 weeks    22: CL 5 cm  10/12/2022: CL 3.9 cm  10/27/22:  CL 4.35 cm, no funneling  22:  CL 4.7 cm, no funneling  23:  EFW 47%, AC 43%, SHA 22.0 cm, vtx       Assessment & Plan Note:     noted      Cervical high risk HPV (human papillomavirus) test positive 2021     Overview Note:     On pap done 2020 in [place] and again 2021:  colpo neg           Problem List Items Addressed This Visit        Endocrine    Diet controlled gestational diabetes mellitus (GDM) in third trimester     noted            Genitourinary    Septate uterus affecting pregnancy in third trimester - Primary     noted            Other    Anemia affecting pregnancy in third trimester     noted         Cystic fibrosis carrier    Multigravida in third trimester     PTL/labor precautions, [facility], and pregnancy warning signs reviewed. Pt advised to call the office at [phone] or go straight to Labor and Delivery at [address] with any of the following concerns vaginal bleeding, leaking of fluid, jason regularly Q 5-7 minutes for over an hour or not feeling the baby move. RTO 1 wk OBV            No orders of the defined types were placed in this encounter. Outpatient Encounter Medications as of 1/19/2023   Medication Sig Dispense Refill    Blood Glucose Monitoring Suppl (ONETOUCH VERIO FLEX SYSTEM) w/Device KIT Use as directed to check blood sugar four times a day 1 kit 0    ferrous sulfate (IRON 325) 325 (65 Fe) MG tablet Take 1 tablet by mouth 2 times daily 60 tablet 5    Prenatal Vit-Fe Fumarate-FA (PRENATAL PO) Take by mouth      glucose monitoring (FREESTYLE FREEDOM) kit 1 kit by Does not apply route daily (Patient not taking: Reported on 1/19/2023) 1 kit 0    blood glucose monitor strips 1 strip by Other route 4 times daily Use four times daily and as needed to check blood sugar (Patient not taking: Reported on 1/19/2023) 200 strip 11    Lancets MISC 1 each by Does not apply route 4 times daily (Patient not taking: Reported on 1/19/2023) 200 each 0     No facility-administered encounter medications on file as of 1/19/2023.                Labor signs, pregnancy warning signs, and fetal movement counting reviewed (if applicable)        Barbara Archibald NP, APRN - CNP 01/19/23 10:39 AM

## 2023-01-19 NOTE — PROGRESS NOTES
I have reviewed the patient's visit today including history, exam and assessment by CORTNEY Matta. I agree with treatment/plan as above.     Abbey Lopez MD  11:52 AM  01/19/23

## 2023-01-19 NOTE — ASSESSMENT & PLAN NOTE
PTL/labor precautions, [address], and pregnancy warning signs reviewed. Pt advised to call the office at [phone] or go straight to Labor and Delivery at [facility] with any of the following concerns vaginal bleeding, leaking of fluid, jason regularly Q 5-7 minutes for over an hour or not feeling the baby move.    RTO 1 wk OBV

## 2023-01-20 DIAGNOSIS — O24.410 DIET CONTROLLED GESTATIONAL DIABETES MELLITUS (GDM) IN THIRD TRIMESTER: Primary | ICD-10-CM

## 2023-01-20 RX ORDER — GLUCOSAMINE HCL/CHONDROITIN SU 500-400 MG
1 CAPSULE ORAL 4 TIMES DAILY
Qty: 200 STRIP | Refills: 11 | Status: SHIPPED | OUTPATIENT
Start: 2023-01-20

## 2023-01-23 ENCOUNTER — FOLLOWUP TELEPHONE ENCOUNTER (OUTPATIENT)
Dept: DIABETES SERVICES | Age: 33
End: 2023-01-23

## 2023-01-24 ENCOUNTER — FOLLOWUP TELEPHONE ENCOUNTER (OUTPATIENT)
Dept: DIABETES SERVICES | Age: 33
End: 2023-01-24

## 2023-01-26 ENCOUNTER — FOLLOWUP TELEPHONE ENCOUNTER (OUTPATIENT)
Dept: DIABETES SERVICES | Age: 33
End: 2023-01-26

## 2023-01-26 NOTE — TELEPHONE ENCOUNTER
Second call to pt after no show for her gestational diabetes education on 1/23/23. Left message. Will no longer pursue. If we don't hear from pt by 2/10/23 we will close.

## 2023-02-02 ENCOUNTER — ROUTINE PRENATAL (OUTPATIENT)
Dept: OBGYN CLINIC | Age: 33
End: 2023-02-02

## 2023-02-02 VITALS — WEIGHT: 290 LBS | SYSTOLIC BLOOD PRESSURE: 116 MMHG | BODY MASS INDEX: 45.42 KG/M2 | DIASTOLIC BLOOD PRESSURE: 76 MMHG

## 2023-02-02 DIAGNOSIS — O99.013 ANEMIA AFFECTING PREGNANCY IN THIRD TRIMESTER: ICD-10-CM

## 2023-02-02 DIAGNOSIS — Z14.1 CYSTIC FIBROSIS CARRIER: ICD-10-CM

## 2023-02-02 DIAGNOSIS — O24.410 DIET CONTROLLED GESTATIONAL DIABETES MELLITUS (GDM) IN THIRD TRIMESTER: ICD-10-CM

## 2023-02-02 DIAGNOSIS — Z34.83 MULTIGRAVIDA IN THIRD TRIMESTER: ICD-10-CM

## 2023-02-02 DIAGNOSIS — O34.03 SEPTATE UTERUS AFFECTING PREGNANCY IN THIRD TRIMESTER: Primary | ICD-10-CM

## 2023-02-02 DIAGNOSIS — Q51.28 SEPTATE UTERUS AFFECTING PREGNANCY IN THIRD TRIMESTER: Primary | ICD-10-CM

## 2023-02-02 PROCEDURE — MISCGLOBALOB GLOBAL OB: Performed by: NURSE PRACTITIONER

## 2023-02-02 NOTE — PROGRESS NOTES
This is a 34 y.o.  [id number] at 34w0d for routine OB visit. Her Estimated Due Date is 3/16/2023, by Ultrasound    Denies leaking of fluid, vaginal bleeding, or regular contractions. Reports fetal movement. Current Outpatient Medications on File Prior to Visit   Medication Sig Dispense Refill    Blood Glucose Monitoring Suppl w/Device KIT Please provide patient with blood glucose monitoring device covered by insurance. Check blood sugar QID 1 kit 0    blood glucose monitor strips 1 strip by Other route 4 times daily Use four times daily and as needed to check blood sugar 200 strip 11    Blood Glucose Monitoring Suppl (ONETOUCH VERIO FLEX SYSTEM) w/Device KIT Use as directed to check blood sugar four times a day 1 kit 0    blood glucose monitor strips 1 strip by Other route 4 times daily Use four times daily and as needed to check blood sugar 200 strip 11    ferrous sulfate (IRON 325) 325 (65 Fe) MG tablet Take 1 tablet by mouth 2 times daily 60 tablet 5    Prenatal Vit-Fe Fumarate-FA (PRENATAL PO) Take by mouth      glucose monitoring (FREESTYLE FREEDOM) kit 1 kit by Does not apply route daily (Patient not taking: Reported on 2023) 1 kit 0    Lancets MISC 1 each by Does not apply route 4 times daily (Patient not taking: Reported on 2023) 200 each 0     No current facility-administered medications on file prior to visit.        Allergies   Allergen Reactions    Penicillins Anaphylaxis       OB History    Para Term  AB Living   4 2 2 0 1 2   SAB IAB Ectopic Molar Multiple Live Births   1 0 0 0 0 2       # 1 - Date: 09, Sex: None, Weight: None, GA: 38w0d, Delivery: Vaginal, Spontaneous, Apgar1: None, Apgar5: None, Living: Living, Birth Comments: None    # 2 - Date: 04/30/15, Sex: Male, Weight: None, GA: 38w0d, Delivery: Vaginal, Spontaneous, Apgar1: None, Apgar5: None, Living: Living, Birth Comments: None    # 3 - Date: 2016, Sex: None, Weight: None, GA: None, Delivery: None, Apgar1: None, Apgar5: None, Living: None, Birth Comments: None    # 4 - Date: None, Sex: None, Weight: None, GA: None, Delivery: None, Apgar1: None, Apgar5: None, Living: None, Birth Comments: None        Past Medical History:   Diagnosis Date    Abnormal Papanicolaou smear of cervix     Dysthymia 2/3/2022       No past surgical history on file.     Family History   Problem Relation Age of Onset    Breast Cancer Neg Hx     Colon Cancer Neg Hx     Ovarian Cancer Neg Hx     Uterine Cancer Neg Hx        Social History     Socioeconomic History    Marital status: Single     Spouse name: Not on file    Number of children: Not on file    Years of education: Not on file    Highest education level: Not on file   Occupational History    Not on file   Tobacco Use    Smoking status: Never    Smokeless tobacco: Never   Substance and Sexual Activity    Alcohol use: No    Drug use: Not Currently    Sexual activity: Yes     Birth control/protection: None   Other Topics Concern    Not on file   Social History Narrative    Abuse: Feels safe at home, no history of physical abuse, no history of sexual abuse      Social Determinants of Health     Financial Resource Strain: Not on file   Food Insecurity: Not on file   Transportation Needs: Not on file   Physical Activity: Not on file   Stress: Not on file   Social Connections: Not on file   Intimate Partner Violence: Not on file   Housing Stability: Not on file           Objective    Vitals:    02/02/23 0959   BP: 116/76   Weight: 290 lb (131.5 kg)       General: well developed, well nourished, in no acute distress    Head: normocephalic and atraumatic    Resp: even and unlabored    Psych: Normal mood and affect        Assessment and Plan      Patient Active Problem List    Diagnosis Date Noted    Anemia affecting pregnancy in third trimester 12/23/2022     Priority: Medium     Overview Note:     Add'l Iron       Assessment & Plan Note:     noted      Diet controlled gestational diabetes mellitus (GDM) in third trimester 2022     Priority: Medium     Overview Note:     Failed 1 hr glucola, 3 hr pending    2023: pt did not bring BS logs, states rx was not covered. New rx sent and pt advised to call office ASAP if not covered  23: no show for diabetes education  2023: Fastings , 1hr 114-181. Starting Metformin 500 BID. Start weekly OBV with US      Uterine size date discrepancy pregnancy, third trimester 2022     Priority: Medium     Overview Note:     FH 35cm @ 28 weeks, US next visit    23:  EFW 47%, AC 43%, SHA 22.0 cm, vtx      Cystic fibrosis carrier 2022     Priority: Medium     Overview Note:     22:  ACOG info prev given to pt, D/W her FOB testing and/or genetic counseling -- pt declines      Multigravida in third trimester 2022     Priority: Medium     Overview Note:     EDC by 8 5/7 week US (not c/w LMP)    2022: NIPT no result, low fetal fraction. SMA, DMD, and Fragile X carrier screening negative  2022: NIPT negx3, female  22:  AFP only neg  2023: tdap given       Assessment & Plan Note:     PTL/labor precautions, [address], and pregnancy warning signs reviewed. Pt advised to call the office at [phone] or go straight to Labor and Delivery at [facility] with any of the following concerns vaginal bleeding, leaking of fluid, jason regularly Q 5-7 minutes for over an hour or not feeling the baby move. RTO weekly OBV/US      Septate uterus affecting pregnancy in third trimester 2022     Priority: Medium     Overview Note:     2022: US today ? septate vs bicornuate uterus. . Pt denies previous dx of this or  any complications/PTL in previous pregnancies.  Will recheck CL and reassess at 16 weeks    22: CL 5 cm  10/12/2022: CL 3.9 cm  10/27/22:  CL 4.35 cm, no funneling  22:  CL 4.7 cm, no funneling  23:  EFW 47%, AC 43%, SHA 22.0 cm, vtx Assessment & Plan Note:     noted      Cervical high risk HPV (human papillomavirus) test positive 12/08/2021     Overview Note:     On pap done June 2020 in [place] and again 12/2021 Feb 2022:  colpo neg           Problem List Items Addressed This Visit        Endocrine    Diet controlled gestational diabetes mellitus (GDM) in third trimester    Relevant Medications    metFORMIN (GLUCOPHAGE) 500 MG tablet       Genitourinary    Septate uterus affecting pregnancy in third trimester - Primary     noted            Other    Anemia affecting pregnancy in third trimester     noted         Cystic fibrosis carrier    Multigravida in third trimester     PTL/labor precautions, [address], and pregnancy warning signs reviewed. Pt advised to call the office at [phone] or go straight to Labor and Delivery at [address] with any of the following concerns vaginal bleeding, leaking of fluid, jason regularly Q 5-7 minutes for over an hour or not feeling the baby move. RTO weekly OBV/US            No orders of the defined types were placed in this encounter. Outpatient Encounter Medications as of 2/2/2023   Medication Sig Dispense Refill    metFORMIN (GLUCOPHAGE) 500 MG tablet Take 1 tablet by mouth 2 times daily (with meals) 60 tablet 5    Blood Glucose Monitoring Suppl w/Device KIT Please provide patient with blood glucose monitoring device covered by insurance.  Check blood sugar QID 1 kit 0    blood glucose monitor strips 1 strip by Other route 4 times daily Use four times daily and as needed to check blood sugar 200 strip 11    Blood Glucose Monitoring Suppl (ONETOUCH VERIO FLEX SYSTEM) w/Device KIT Use as directed to check blood sugar four times a day 1 kit 0    blood glucose monitor strips 1 strip by Other route 4 times daily Use four times daily and as needed to check blood sugar 200 strip 11    ferrous sulfate (IRON 325) 325 (65 Fe) MG tablet Take 1 tablet by mouth 2 times daily 60 tablet 5    Prenatal Vit-Fe Fumarate-FA (PRENATAL PO) Take by mouth      glucose monitoring (FREESTYLE FREEDOM) kit 1 kit by Does not apply route daily (Patient not taking: Reported on 1/19/2023) 1 kit 0    Lancets MISC 1 each by Does not apply route 4 times daily (Patient not taking: Reported on 1/19/2023) 200 each 0     No facility-administered encounter medications on file as of 2/2/2023.                Labor signs, pregnancy warning signs, and fetal movement counting reviewed (if applicable)        Maritza Childers NP, APRN - CNP 02/02/23 10:13 AM

## 2023-02-02 NOTE — ASSESSMENT & PLAN NOTE
PTL/labor precautions, [facility], and pregnancy warning signs reviewed. Pt advised to call the office at [phone] or go straight to Labor and Delivery at [facility] with any of the following concerns vaginal bleeding, leaking of fluid, jason regularly Q 5-7 minutes for over an hour or not feeling the baby move.   RTO weekly OBV/US

## 2023-02-09 ENCOUNTER — ROUTINE PRENATAL (OUTPATIENT)
Dept: OBGYN CLINIC | Age: 33
End: 2023-02-09

## 2023-02-09 VITALS
BODY MASS INDEX: 45.2 KG/M2 | WEIGHT: 288 LBS | SYSTOLIC BLOOD PRESSURE: 130 MMHG | HEIGHT: 67 IN | DIASTOLIC BLOOD PRESSURE: 70 MMHG

## 2023-02-09 DIAGNOSIS — O24.415 GESTATIONAL DIABETES MELLITUS (GDM) IN THIRD TRIMESTER CONTROLLED ON ORAL HYPOGLYCEMIC DRUG: Primary | ICD-10-CM

## 2023-02-09 DIAGNOSIS — O99.013 ANEMIA AFFECTING PREGNANCY IN THIRD TRIMESTER: ICD-10-CM

## 2023-02-09 DIAGNOSIS — O34.03 SEPTATE UTERUS AFFECTING PREGNANCY IN THIRD TRIMESTER: ICD-10-CM

## 2023-02-09 DIAGNOSIS — Z34.83 MULTIGRAVIDA IN THIRD TRIMESTER: ICD-10-CM

## 2023-02-09 DIAGNOSIS — Q51.28 SEPTATE UTERUS AFFECTING PREGNANCY IN THIRD TRIMESTER: ICD-10-CM

## 2023-02-09 DIAGNOSIS — Z14.1 CYSTIC FIBROSIS CARRIER: ICD-10-CM

## 2023-02-09 SDOH — ECONOMIC STABILITY: HOUSING INSECURITY
IN THE LAST 12 MONTHS, WAS THERE A TIME WHEN YOU DID NOT HAVE A STEADY PLACE TO SLEEP OR SLEPT IN A SHELTER (INCLUDING NOW)?: NO

## 2023-02-09 SDOH — ECONOMIC STABILITY: INCOME INSECURITY: HOW HARD IS IT FOR YOU TO PAY FOR THE VERY BASICS LIKE FOOD, HOUSING, MEDICAL CARE, AND HEATING?: NOT HARD AT ALL

## 2023-02-09 SDOH — ECONOMIC STABILITY: FOOD INSECURITY: WITHIN THE PAST 12 MONTHS, YOU WORRIED THAT YOUR FOOD WOULD RUN OUT BEFORE YOU GOT MONEY TO BUY MORE.: NEVER TRUE

## 2023-02-09 SDOH — ECONOMIC STABILITY: FOOD INSECURITY: WITHIN THE PAST 12 MONTHS, THE FOOD YOU BOUGHT JUST DIDN'T LAST AND YOU DIDN'T HAVE MONEY TO GET MORE.: NEVER TRUE

## 2023-02-09 ASSESSMENT — PATIENT HEALTH QUESTIONNAIRE - PHQ9
3. TROUBLE FALLING OR STAYING ASLEEP: 0
SUM OF ALL RESPONSES TO PHQ9 QUESTIONS 1 & 2: 0
6. FEELING BAD ABOUT YOURSELF - OR THAT YOU ARE A FAILURE OR HAVE LET YOURSELF OR YOUR FAMILY DOWN: 0
8. MOVING OR SPEAKING SO SLOWLY THAT OTHER PEOPLE COULD HAVE NOTICED. OR THE OPPOSITE, BEING SO FIGETY OR RESTLESS THAT YOU HAVE BEEN MOVING AROUND A LOT MORE THAN USUAL: 0
10. IF YOU CHECKED OFF ANY PROBLEMS, HOW DIFFICULT HAVE THESE PROBLEMS MADE IT FOR YOU TO DO YOUR WORK, TAKE CARE OF THINGS AT HOME, OR GET ALONG WITH OTHER PEOPLE: 0
SUM OF ALL RESPONSES TO PHQ QUESTIONS 1-9: 0
SUM OF ALL RESPONSES TO PHQ QUESTIONS 1-9: 0
5. POOR APPETITE OR OVEREATING: 0
7. TROUBLE CONCENTRATING ON THINGS, SUCH AS READING THE NEWSPAPER OR WATCHING TELEVISION: 0
SUM OF ALL RESPONSES TO PHQ QUESTIONS 1-9: 0
1. LITTLE INTEREST OR PLEASURE IN DOING THINGS: 0
4. FEELING TIRED OR HAVING LITTLE ENERGY: 0
2. FEELING DOWN, DEPRESSED OR HOPELESS: 0
9. THOUGHTS THAT YOU WOULD BE BETTER OFF DEAD, OR OF HURTING YOURSELF: 0
SUM OF ALL RESPONSES TO PHQ QUESTIONS 1-9: 0

## 2023-02-09 NOTE — PROGRESS NOTES
Patient comes in today for routine prenatal visit. No complaints/concerns today. Patient did not bring BSG.       Fetal Movement: yes   Contractions: Yes-BH  Vaginal Bleeding: No  Leaking Fluid: No  GI/: Yes    Vitals:    02/09/23 0826   BP: 130/70   Site: Left Upper Arm   Position: Sitting   Weight: 288 lb (130.6 kg)   Height: 5' 7\" (1.702 m)

## 2023-02-09 NOTE — ASSESSMENT & PLAN NOTE
PTL/labor precautions, [facility], and pregnancy warning signs reviewed. Pt advised to call the office at [phone] or go straight to Labor and Delivery at [facility] with any of the following concerns vaginal bleeding, leaking of fluid, jason regularly Q 5-7 minutes for over an hour or not feeling the baby move.    RTO weekly OBV/US  GBS next visit

## 2023-02-09 NOTE — PROGRESS NOTES
This is a 34 y.o.  [id number] at 35w0d for routine OB visit. Her Estimated Due Date is 3/16/2023, by Ultrasound    Denies leaking of fluid, vaginal bleeding, or regular contractions. Reports fetal movement. Current Outpatient Medications on File Prior to Visit   Medication Sig Dispense Refill    metFORMIN (GLUCOPHAGE) 500 MG tablet Take 1 tablet by mouth 2 times daily (with meals) 60 tablet 5    blood glucose monitor strips 1 strip by Other route 4 times daily Use four times daily and as needed to check blood sugar 200 strip 11    Blood Glucose Monitoring Suppl (ONETOUCH VERIO FLEX SYSTEM) w/Device KIT Use as directed to check blood sugar four times a day 1 kit 0    ferrous sulfate (IRON 325) 325 (65 Fe) MG tablet Take 1 tablet by mouth 2 times daily 60 tablet 5    Prenatal Vit-Fe Fumarate-FA (PRENATAL PO) Take by mouth       No current facility-administered medications on file prior to visit. Allergies   Allergen Reactions    Penicillins Anaphylaxis       OB History    Para Term  AB Living   4 2 2 0 1 2   SAB IAB Ectopic Molar Multiple Live Births   1 0 0 0 0 2       # 1 - Date: 09, Sex: None, Weight: None, GA: 38w0d, Delivery: Vaginal, Spontaneous, Apgar1: None, Apgar5: None, Living: Living, Birth Comments: None    # 2 - Date: 04/30/15, Sex: Male, Weight: None, GA: 38w0d, Delivery: Vaginal, Spontaneous, Apgar1: None, Apgar5: None, Living: Living, Birth Comments: None    # 3 - Date: 2016, Sex: None, Weight: None, GA: None, Delivery: None, Apgar1: None, Apgar5: None, Living: None, Birth Comments: None    # 4 - Date: None, Sex: None, Weight: None, GA: None, Delivery: None, Apgar1: None, Apgar5: None, Living: None, Birth Comments: None        Past Medical History:   Diagnosis Date    Abnormal Papanicolaou smear of cervix     Dysthymia 2/3/2022       History reviewed. No pertinent surgical history.     Family History   Problem Relation Age of Onset    Breast Cancer Neg Hx     Colon Cancer Neg Hx     Ovarian Cancer Neg Hx     Uterine Cancer Neg Hx        Social History     Socioeconomic History    Marital status: Single     Spouse name: Not on file    Number of children: Not on file    Years of education: Not on file    Highest education level: Not on file   Occupational History    Not on file   Tobacco Use    Smoking status: Never    Smokeless tobacco: Never   Substance and Sexual Activity    Alcohol use: No    Drug use: Not Currently    Sexual activity: Yes     Birth control/protection: None   Other Topics Concern    Not on file   Social History Narrative    Abuse: Feels safe at home, no history of physical abuse, no history of sexual abuse      Social Determinants of Health     Financial Resource Strain: Low Risk     Difficulty of Paying Living Expenses: Not hard at all   Food Insecurity: No Food Insecurity    Worried About Running Out of Food in the Last Year: Never true    Marie of Food in the Last Year: Never true   Transportation Needs: Unknown    Lack of Transportation (Medical): Not on file    Lack of Transportation (Non-Medical):  No   Physical Activity: Not on file   Stress: Not on file   Social Connections: Not on file   Intimate Partner Violence: Not on file   Housing Stability: Unknown    Unable to Pay for Housing in the Last Year: Not on file    Number of Places Lived in the Last Year: Not on file    Unstable Housing in the Last Year: No           Objective    Vitals:    02/09/23 0826   BP: 130/70   Site: Left Upper Arm   Position: Sitting   Weight: 288 lb (130.6 kg)   Height: 5' 7\" (1.702 m)       General: well developed, well nourished, in no acute distress    Head: normocephalic and atraumatic    Resp: even and unlabored    Psych: Normal mood and affect        Assessment and Plan      Patient Active Problem List    Diagnosis Date Noted    Anemia affecting pregnancy in third trimester 12/23/2022     Priority: Medium     Overview Note:     Add'l Iron       Assessment & Plan Note:     noted      Gestational diabetes mellitus (GDM) in third trimester controlled on oral hypoglycemic drug 2022     Priority: Medium     Overview Note:     Failed 1 hr glucola, 3 hr pending    2023: pt did not bring BS logs, states rx was not covered. New rx sent and pt advised to call office ASAP if not covered  23: no show for diabetes education  2023: [place] , 1hr 114-181. Starting Metformin 500 BID. Start weekly OBV with US  2023: did not bring BS logs, reports fastings 80-90s and 1 hr PP 100s-110s      Uterine size date discrepancy pregnancy, third trimester 2022     Priority: Medium     Overview Note:     FH 35cm @ 28 weeks, US next visit    23:  EFW 47%, AC 43%, SHA 22.0 cm, vtx      Cystic fibrosis carrier 2022     Priority: Medium     Overview Note:     22:  ACOG info prev given to pt, D/W her FOB testing and/or genetic counseling -- pt declines       Assessment & Plan Note:     noted      Multigravida in third trimester 2022     Priority: Medium     Overview Note:     EDC by 8 5/7 week US (not c/w LMP)    2022: NIPT no result, low fetal fraction. SMA, DMD, and Fragile X carrier screening negative  2022: NIPT negx3, female  22:  AFP only neg  2023: tdap given       Assessment & Plan Note:     PTL/labor precautions, [address], and pregnancy warning signs reviewed. Pt advised to call the office at [phone] or go straight to Labor and Delivery at [facility] with any of the following concerns vaginal bleeding, leaking of fluid, jason regularly Q 5-7 minutes for over an hour or not feeling the baby move. RTO weekly OBV/US  GBS next visit      Septate uterus affecting pregnancy in third trimester 2022     Priority: Medium     Overview Note:     2022: US today ? septate vs bicornuate uterus. .  Pt denies previous dx of this or  any complications/PTL in previous pregnancies. Will recheck CL and reassess at 16 weeks    9/29/22: CL 5 cm  10/12/2022: CL 3.9 cm  10/27/22:  CL 4.35 cm, no funneling  11/29/22:  CL 4.7 cm, no funneling  1/6/23:  EFW 47%, AC 43%, SHA 22.0 cm, vtx  2/9/2023: EFW 58%, AC 37%, SHA nl, BPP 8/8, vertex      Cervical high risk HPV (human papillomavirus) test positive 12/08/2021     Overview Note:     On pap done June 2020 in [place] and again 12/2021 Feb 2022:  colpo neg           Problem List Items Addressed This Visit        Endocrine    Gestational diabetes mellitus (GDM) in third trimester controlled on oral hypoglycemic drug - Primary    Relevant Orders    AMB POC US OB RE-EVAL/FOLLOW UP (Completed)    AMB POC US FETAL BIOPHYSICAL PROFILE W/O NON STRESS TESTING (Completed)       Genitourinary    Septate uterus affecting pregnancy in third trimester    Relevant Orders    AMB POC US OB RE-EVAL/FOLLOW UP (Completed)    AMB POC US FETAL BIOPHYSICAL PROFILE W/O NON STRESS TESTING (Completed)       Other    Anemia affecting pregnancy in third trimester     noted         Cystic fibrosis carrier     noted         Relevant Orders    AMB POC US OB RE-EVAL/FOLLOW UP (Completed)    AMB POC US FETAL BIOPHYSICAL PROFILE W/O NON STRESS TESTING (Completed)    Multigravida in third trimester     PTL/labor precautions, [facility], and pregnancy warning signs reviewed. Pt advised to call the office at [phone] or go straight to Labor and Delivery at [facility] with any of the following concerns vaginal bleeding, leaking of fluid, jason regularly Q 5-7 minutes for over an hour or not feeling the baby move.    RTO weekly OBV/US  GBS next visit         Relevant Orders    AMB POC US OB RE-EVAL/FOLLOW UP (Completed)    AMB POC US FETAL BIOPHYSICAL PROFILE W/O NON STRESS TESTING (Completed)       Orders Placed This Encounter   Procedures    AMB POC US OB RE-EVAL/FOLLOW UP    AMB POC US FETAL BIOPHYSICAL PROFILE W/O NON STRESS TESTING       Outpatient Encounter Medications as of 2/9/2023   Medication Sig Dispense Refill    metFORMIN (GLUCOPHAGE) 500 MG tablet Take 1 tablet by mouth 2 times daily (with meals) 60 tablet 5    blood glucose monitor strips 1 strip by Other route 4 times daily Use four times daily and as needed to check blood sugar 200 strip 11    Blood Glucose Monitoring Suppl (ONETOUCH VERIO FLEX SYSTEM) w/Device KIT Use as directed to check blood sugar four times a day 1 kit 0    ferrous sulfate (IRON 325) 325 (65 Fe) MG tablet Take 1 tablet by mouth 2 times daily 60 tablet 5    Prenatal Vit-Fe Fumarate-FA (PRENATAL PO) Take by mouth      [DISCONTINUED] Blood Glucose Monitoring Suppl w/Device KIT Please provide patient with blood glucose monitoring device covered by insurance. Check blood sugar QID 1 kit 0    [DISCONTINUED] glucose monitoring (FREESTYLE FREEDOM) kit 1 kit by Does not apply route daily (Patient not taking: No sig reported) 1 kit 0    [DISCONTINUED] blood glucose monitor strips 1 strip by Other route 4 times daily Use four times daily and as needed to check blood sugar 200 strip 11    [DISCONTINUED] Lancets MISC 1 each by Does not apply route 4 times daily (Patient not taking: No sig reported) 200 each 0     No facility-administered encounter medications on file as of 2/9/2023.                Labor signs, pregnancy warning signs, and fetal movement counting reviewed (if applicable)        Aneudy Nuñez NP, APRN - CNP 02/09/23 8:38 AM

## 2023-02-16 ENCOUNTER — ROUTINE PRENATAL (OUTPATIENT)
Dept: OBGYN CLINIC | Age: 33
End: 2023-02-16

## 2023-02-16 VITALS
DIASTOLIC BLOOD PRESSURE: 82 MMHG | SYSTOLIC BLOOD PRESSURE: 124 MMHG | WEIGHT: 287 LBS | BODY MASS INDEX: 45.04 KG/M2 | HEIGHT: 67 IN

## 2023-02-16 DIAGNOSIS — O99.013 ANEMIA AFFECTING PREGNANCY IN THIRD TRIMESTER: ICD-10-CM

## 2023-02-16 DIAGNOSIS — Q51.28 SEPTATE UTERUS AFFECTING PREGNANCY IN THIRD TRIMESTER: ICD-10-CM

## 2023-02-16 DIAGNOSIS — Z14.1 CYSTIC FIBROSIS CARRIER: ICD-10-CM

## 2023-02-16 DIAGNOSIS — Z34.83 MULTIGRAVIDA IN THIRD TRIMESTER: ICD-10-CM

## 2023-02-16 DIAGNOSIS — O34.03 SEPTATE UTERUS AFFECTING PREGNANCY IN THIRD TRIMESTER: ICD-10-CM

## 2023-02-16 DIAGNOSIS — O26.843 UTERINE SIZE DATE DISCREPANCY PREGNANCY, THIRD TRIMESTER: ICD-10-CM

## 2023-02-16 DIAGNOSIS — O24.415 GESTATIONAL DIABETES MELLITUS (GDM) IN THIRD TRIMESTER CONTROLLED ON ORAL HYPOGLYCEMIC DRUG: Primary | ICD-10-CM

## 2023-02-16 LAB
ERYTHROCYTE [DISTWIDTH] IN BLOOD BY AUTOMATED COUNT: 15.2 % (ref 11.9–14.6)
HCT VFR BLD AUTO: 33.1 % (ref 35.8–46.3)
HGB BLD-MCNC: 10.5 G/DL (ref 11.7–15.4)
MCH RBC QN AUTO: 26.9 PG (ref 26.1–32.9)
MCHC RBC AUTO-ENTMCNC: 31.7 G/DL (ref 31.4–35)
MCV RBC AUTO: 84.9 FL (ref 82–102)
NRBC # BLD: 0 K/UL (ref 0–0.2)
PLATELET # BLD AUTO: 338 K/UL (ref 150–450)
PMV BLD AUTO: 10 FL (ref 9.4–12.3)
RBC # BLD AUTO: 3.9 M/UL (ref 4.05–5.2)
WBC # BLD AUTO: 8.8 K/UL (ref 4.3–11.1)

## 2023-02-16 ASSESSMENT — PATIENT HEALTH QUESTIONNAIRE - PHQ9
6. FEELING BAD ABOUT YOURSELF - OR THAT YOU ARE A FAILURE OR HAVE LET YOURSELF OR YOUR FAMILY DOWN: 0
SUM OF ALL RESPONSES TO PHQ9 QUESTIONS 1 & 2: 0
1. LITTLE INTEREST OR PLEASURE IN DOING THINGS: 0
4. FEELING TIRED OR HAVING LITTLE ENERGY: 0
10. IF YOU CHECKED OFF ANY PROBLEMS, HOW DIFFICULT HAVE THESE PROBLEMS MADE IT FOR YOU TO DO YOUR WORK, TAKE CARE OF THINGS AT HOME, OR GET ALONG WITH OTHER PEOPLE: 0
2. FEELING DOWN, DEPRESSED OR HOPELESS: 0
7. TROUBLE CONCENTRATING ON THINGS, SUCH AS READING THE NEWSPAPER OR WATCHING TELEVISION: 0
5. POOR APPETITE OR OVEREATING: 0
SUM OF ALL RESPONSES TO PHQ QUESTIONS 1-9: 0
SUM OF ALL RESPONSES TO PHQ QUESTIONS 1-9: 0
8. MOVING OR SPEAKING SO SLOWLY THAT OTHER PEOPLE COULD HAVE NOTICED. OR THE OPPOSITE, BEING SO FIGETY OR RESTLESS THAT YOU HAVE BEEN MOVING AROUND A LOT MORE THAN USUAL: 0
SUM OF ALL RESPONSES TO PHQ QUESTIONS 1-9: 0
3. TROUBLE FALLING OR STAYING ASLEEP: 0
SUM OF ALL RESPONSES TO PHQ QUESTIONS 1-9: 0
9. THOUGHTS THAT YOU WOULD BE BETTER OFF DEAD, OR OF HURTING YOURSELF: 0

## 2023-02-16 NOTE — ASSESSMENT & PLAN NOTE
PTL/labor precautions, [address], and pregnancy warning signs reviewed. Pt advised to call the office at [phone] or go straight to Labor and Delivery at [facility] with any of the following concerns vaginal bleeding, leaking of fluid, jason regularly Q 5-7 minutes for over an hour or not feeling the baby move.    RTO weekly OBV/US

## 2023-02-16 NOTE — PROGRESS NOTES
Patient comes in today for routine prenatal visit. Pt reports pressure in vagina and more contractions.      Fetal Movement: Yes  Contractions: Yes consistent for a short amount of time then stop   Vaginal Bleeding: Yes some yesterday   Leaking Fluid: No  GI/: No    Vitals:    02/16/23 0846   BP: 124/82   Site: Left Upper Arm   Position: Sitting   Weight: 287 lb (130.2 kg)   Height: 5' 7\" (1.702 m)

## 2023-02-16 NOTE — PROGRESS NOTES
This is a 34 y.o.  [id number] at 36w0d for routine OB visit. Her Estimated Due Date is 3/16/2023, by Ultrasound    Denies leaking of fluid, vaginal bleeding, or regular contractions. Reports fetal movement. More pelvic pressure/BH ctxs    Current Outpatient Medications on File Prior to Visit   Medication Sig Dispense Refill    Ascorbic Acid (VITAMIN C PO) Take by mouth      metFORMIN (GLUCOPHAGE) 500 MG tablet Take 1 tablet by mouth 2 times daily (with meals) 60 tablet 5    blood glucose monitor strips 1 strip by Other route 4 times daily Use four times daily and as needed to check blood sugar 200 strip 11    Blood Glucose Monitoring Suppl (ONETOUCH VERIO FLEX SYSTEM) w/Device KIT Use as directed to check blood sugar four times a day 1 kit 0    ferrous sulfate (IRON 325) 325 (65 Fe) MG tablet Take 1 tablet by mouth 2 times daily 60 tablet 5    Prenatal Vit-Fe Fumarate-FA (PRENATAL PO) Take by mouth       No current facility-administered medications on file prior to visit. Allergies   Allergen Reactions    Penicillins Anaphylaxis       OB History    Para Term  AB Living   4 2 2 0 1 2   SAB IAB Ectopic Molar Multiple Live Births   1 0 0 0 0 2       # 1 - Date: 09, Sex: None, Weight: None, GA: 38w0d, Delivery: Vaginal, Spontaneous, Apgar1: None, Apgar5: None, Living: Living, Birth Comments: None    # 2 - Date: 04/30/15, Sex: Male, Weight: None, GA: 38w0d, Delivery: Vaginal, Spontaneous, Apgar1: None, Apgar5: None, Living: Living, Birth Comments: None    # 3 - Date: 2016, Sex: None, Weight: None, GA: None, Delivery: None, Apgar1: None, Apgar5: None, Living: None, Birth Comments: None    # 4 - Date: None, Sex: None, Weight: None, GA: None, Delivery: None, Apgar1: None, Apgar5: None, Living: None, Birth Comments: None        Past Medical History:   Diagnosis Date    Abnormal Papanicolaou smear of cervix     Dysthymia 2/3/2022       History reviewed.  No pertinent surgical history. Family History   Problem Relation Age of Onset    Breast Cancer Neg Hx     Colon Cancer Neg Hx     Ovarian Cancer Neg Hx     Uterine Cancer Neg Hx        Social History     Socioeconomic History    Marital status: Single     Spouse name: Not on file    Number of children: Not on file    Years of education: Not on file    Highest education level: Not on file   Occupational History    Not on file   Tobacco Use    Smoking status: Never    Smokeless tobacco: Never   Substance and Sexual Activity    Alcohol use: No    Drug use: Not Currently    Sexual activity: Yes     Partners: Male     Birth control/protection: None   Other Topics Concern    Not on file   Social History Narrative    Abuse: Feels safe at home, no history of physical abuse, no history of sexual abuse      Social Determinants of Health     Financial Resource Strain: Low Risk     Difficulty of Paying Living Expenses: Not hard at all   Food Insecurity: No Food Insecurity    Worried About Running Out of Food in the Last Year: Never true    Marie of Food in the Last Year: Never true   Transportation Needs: Unknown    Lack of Transportation (Medical): Not on file    Lack of Transportation (Non-Medical):  No   Physical Activity: Not on file   Stress: Not on file   Social Connections: Not on file   Intimate Partner Violence: Not on file   Housing Stability: Unknown    Unable to Pay for Housing in the Last Year: Not on file    Number of Places Lived in the Last Year: Not on file    Unstable Housing in the Last Year: No           Objective    Vitals:    02/16/23 0846   BP: 124/82   Site: Left Upper Arm   Position: Sitting   Weight: 287 lb (130.2 kg)   Height: 5' 7\" (1.702 m)       General: well developed, well nourished, in no acute distress    Head: normocephalic and atraumatic    Resp: even and unlabored    Psych: Normal mood and affect        Assessment and Plan      Patient Active Problem List    Diagnosis Date Noted    Anemia affecting pregnancy in third trimester 12/23/2022     Priority: Medium     Overview Note:     Add'l Iron       Assessment & Plan Note:     Cbc today      Gestational diabetes mellitus (GDM) in third trimester controlled on oral hypoglycemic drug 12/23/2022     Priority: Medium     Overview Note:     Failed 1 hr glucola, 3 hr pending    1/19/2023: pt did not bring BS logs, states rx was not covered. New rx sent and pt advised to call office ASAP if not covered  1/23/23: no show for diabetes education  2/2/2023: Fastings , 1hr 114-181. Starting Metformin 500 BID. Start weekly OBV with US  2/9/2023: did not bring BS logs, reports fastings 80-90s and 1 hr PP 100s-110s  2/16/2023: fastings 85-92, 1 hr pp , mostly all within target. Continue Metformin 500       Uterine size date discrepancy pregnancy, third trimester 12/22/2022     Priority: Medium     Overview Note:     FH 35cm @ 28 weeks, US next visit    1/6/23:  EFW 47%, AC 43%, SHA 22.0 cm, vtx  2/9/2023: EFW 58%, AC 37%, SHA nl, BPP 8/8, vertex      Cystic fibrosis carrier 08/31/2022     Priority: Medium     Overview Note:     9/8/22:  ACOG info prev given to pt, D/W her FOB testing and/or genetic counseling -- pt declines      Multigravida in third trimester 08/09/2022     Priority: Medium     Overview Note:     EDC by 8 5/7 week US (not c/w LMP)    08/17/2022: NIPT no result, low fetal fraction. SMA, DMD, and Fragile X carrier screening negative  08/31/2022: NIPT negx3, female  9/29/22:  AFP only neg  1/6/2023: tdap given       Assessment & Plan Note:     PTL/labor precautions, [address], and pregnancy warning signs reviewed. Pt advised to call the office at [phone] or go straight to [facility] Delivery [facility] with any of the following concerns vaginal bleeding, leaking of fluid, jason regularly Q 5-7 minutes for over an hour or not feeling the baby move.    RTO weekly OBV/US      Septate uterus affecting pregnancy in third trimester 2022     Priority: Medium     Overview Note:     2022: US today ? septate vs bicornuate uterus. . Pt denies previous dx of this or  any complications/PTL in previous pregnancies. Will recheck CL and reassess at 16 weeks    22: CL 5 cm  10/12/2022: CL 3.9 cm  10/27/22:  CL 4.35 cm, no funneling  22:  CL 4.7 cm, no funneling  23:  EFW 47%, AC 43%, SHA 22.0 cm, vtx  2023: EFW 58%, AC 37%, SHA nl, BPP 8, vertex      Cervical high risk HPV (human papillomavirus) test positive 2021     Overview Note:     On pap done 2020 in [place] and again 2021:  colpo neg           Problem List Items Addressed This Visit        Endocrine    Gestational diabetes mellitus (GDM) in third trimester controlled on oral hypoglycemic drug - Primary    Relevant Orders    AMB POC US FETAL BIOPHYSICAL PROFILE W/O NON STRESS TESTING (Completed)       Genitourinary    Septate uterus affecting pregnancy in third trimester    Relevant Orders    AMB POC US FETAL BIOPHYSICAL PROFILE W/O NON STRESS TESTING (Completed)       Other    Anemia affecting pregnancy in third trimester     Cbc today         Relevant Orders    CBC    Cystic fibrosis carrier    Relevant Orders    AMB POC US FETAL BIOPHYSICAL PROFILE W/O NON STRESS TESTING (Completed)    Rafael Newmanng in third trimester     PTL/labor precautions, [address], and pregnancy warning signs reviewed. Pt advised to call the office at [phone] or go straight to [facility] and Delivery at [facility] with any of the following concerns vaginal bleeding, leaking of fluid, jason regularly Q 5-7 minutes for over an hour or not feeling the baby move.    RTO weekly OBV/US         Relevant Orders    AMB POC US FETAL BIOPHYSICAL PROFILE W/O NON STRESS TESTING (Completed)    Culture, GBS with Penicillin Allergy    CBC    Uterine size date discrepancy pregnancy, third trimester       Orders Placed This Encounter   Procedures    Culture, GBS with Penicillin Allergy    AMB POC US FETAL BIOPHYSICAL PROFILE W/O NON STRESS TESTING    CBC       Outpatient Encounter Medications as of 2/16/2023   Medication Sig Dispense Refill    Ascorbic Acid (VITAMIN C PO) Take by mouth      metFORMIN (GLUCOPHAGE) 500 MG tablet Take 1 tablet by mouth 2 times daily (with meals) 60 tablet 5    blood glucose monitor strips 1 strip by Other route 4 times daily Use four times daily and as needed to check blood sugar 200 strip 11    Blood Glucose Monitoring Suppl (ONETOUCH VERIO FLEX SYSTEM) w/Device KIT Use as directed to check blood sugar four times a day 1 kit 0    ferrous sulfate (IRON 325) 325 (65 Fe) MG tablet Take 1 tablet by mouth 2 times daily 60 tablet 5    Prenatal Vit-Fe Fumarate-FA (PRENATAL PO) Take by mouth       No facility-administered encounter medications on file as of 2/16/2023.                Labor signs, pregnancy warning signs, and fetal movement counting reviewed (if applicable)        Sydney Duke NP, APRN - CNP 02/16/23 9:01 AM

## 2023-02-19 LAB
BACTERIA SPEC CULT: ABNORMAL
BACTERIA SPEC CULT: ABNORMAL
SERVICE CMNT-IMP: ABNORMAL

## 2023-02-23 ENCOUNTER — ROUTINE PRENATAL (OUTPATIENT)
Dept: OBGYN CLINIC | Age: 33
End: 2023-02-23

## 2023-02-23 VITALS
SYSTOLIC BLOOD PRESSURE: 132 MMHG | HEIGHT: 67 IN | DIASTOLIC BLOOD PRESSURE: 70 MMHG | WEIGHT: 289 LBS | BODY MASS INDEX: 45.36 KG/M2

## 2023-02-23 DIAGNOSIS — O24.415 GESTATIONAL DIABETES MELLITUS (GDM) IN THIRD TRIMESTER CONTROLLED ON ORAL HYPOGLYCEMIC DRUG: Primary | ICD-10-CM

## 2023-02-23 DIAGNOSIS — O99.013 ANEMIA AFFECTING PREGNANCY IN THIRD TRIMESTER: ICD-10-CM

## 2023-02-23 DIAGNOSIS — Z34.83 MULTIGRAVIDA IN THIRD TRIMESTER: ICD-10-CM

## 2023-02-23 NOTE — PROGRESS NOTES
This is a 34 y.o.  [id number] at 37w0d for routine OB visit. Her Estimated Due Date is 3/16/2023, by Ultrasound    Denies leaking of fluid, vaginal bleeding, or regular contractions. Reports fetal movement. Current Outpatient Medications on File Prior to Visit   Medication Sig Dispense Refill    Ascorbic Acid (VITAMIN C PO) Take by mouth      metFORMIN (GLUCOPHAGE) 500 MG tablet Take 1 tablet by mouth 2 times daily (with meals) 60 tablet 5    blood glucose monitor strips 1 strip by Other route 4 times daily Use four times daily and as needed to check blood sugar 200 strip 11    Blood Glucose Monitoring Suppl (ONETOUCH VERIO FLEX SYSTEM) w/Device KIT Use as directed to check blood sugar four times a day 1 kit 0    ferrous sulfate (IRON 325) 325 (65 Fe) MG tablet Take 1 tablet by mouth 2 times daily 60 tablet 5    Prenatal Vit-Fe Fumarate-FA (PRENATAL PO) Take by mouth       No current facility-administered medications on file prior to visit. Allergies   Allergen Reactions    Penicillins Anaphylaxis       OB History    Para Term  AB Living   4 2 2 0 1 2   SAB IAB Ectopic Molar Multiple Live Births   1 0 0 0 0 2       # 1 - Date: 09, Sex: None, Weight: None, GA: 38w0d, Delivery: Vaginal, Spontaneous, Apgar1: None, Apgar5: None, Living: Living, Birth Comments: None    # 2 - Date: 04/30/15, Sex: Male, Weight: None, GA: 38w0d, Delivery: Vaginal, Spontaneous, Apgar1: None, Apgar5: None, Living: Living, Birth Comments: None    # 3 - Date: 2016, Sex: None, Weight: None, GA: None, Delivery: None, Apgar1: None, Apgar5: None, Living: None, Birth Comments: None    # 4 - Date: None, Sex: None, Weight: None, GA: None, Delivery: None, Apgar1: None, Apgar5: None, Living: None, Birth Comments: None        Past Medical History:   Diagnosis Date    Abnormal Papanicolaou smear of cervix     Dysthymia 2/3/2022       History reviewed. No pertinent surgical history.     Family History   Problem Relation Age of Onset    Breast Cancer Neg Hx     Colon Cancer Neg Hx     Ovarian Cancer Neg Hx     Uterine Cancer Neg Hx        Social History     Socioeconomic History    Marital status: Single     Spouse name: Not on file    Number of children: Not on file    Years of education: Not on file    Highest education level: Not on file   Occupational History    Not on file   Tobacco Use    Smoking status: Never    Smokeless tobacco: Never   Substance and Sexual Activity    Alcohol use: No    Drug use: Not Currently    Sexual activity: Yes     Partners: Male     Birth control/protection: None   Other Topics Concern    Not on file   Social History Narrative    Abuse: Feels safe at home, no history of physical abuse, no history of sexual abuse      Social Determinants of Health     Financial Resource Strain: Low Risk     Difficulty of Paying Living Expenses: Not hard at all   Food Insecurity: No Food Insecurity    Worried About Running Out of Food in the Last Year: Never true    Marie of Food in the Last Year: Never true   Transportation Needs: Unknown    Lack of Transportation (Medical): Not on file    Lack of Transportation (Non-Medical):  No   Physical Activity: Not on file   Stress: Not on file   Social Connections: Not on file   Intimate Partner Violence: Not on file   Housing Stability: Unknown    Unable to Pay for Housing in the Last Year: Not on file    Number of Places Lived in the Last Year: Not on file    Unstable Housing in the Last Year: No           Objective    Vitals:    02/23/23 0928   BP: 132/70   Site: Left Upper Arm   Position: Sitting   Weight: 289 lb (131.1 kg)   Height: 5' 7\" (1.702 m)       General: well developed, well nourished, in no acute distress    Head: normocephalic and atraumatic    Resp: even and unlabored    Psych: Normal mood and affect        Assessment and Plan      Patient Active Problem List    Diagnosis Date Noted    Anemia affecting pregnancy in third trimester 12/23/2022     Priority: Medium     Overview Note:     Add'l Iron    2/16/23: Hgb 10.5       Assessment & Plan Note:     noted      Gestational diabetes mellitus (GDM) in third trimester controlled on oral hypoglycemic drug 12/23/2022     Priority: Medium     Overview Note:     Failed 1 hr glucola, 3 hr pending    1/19/2023: pt did not bring BS logs, states rx was not covered. New rx sent and pt advised to call office ASAP if not covered  1/23/23: no show for diabetes education  2/2/2023: Fastings , 1hr 114-181. Starting Metformin 500 BID. Start weekly OBV with US  2/9/2023: did not bring BS logs, reports fastings 80-90s and 1 hr PP 100s-110s  2/16/2023: fastings 85-92, 1 hr pp , mostly all within target. Continue Metformin 500   2/23/2023: did nto bring BS logs, pt reports fastings 80-90s, 1hr PP 90s-100      Uterine size date discrepancy pregnancy, third trimester 12/22/2022     Priority: Medium     Overview Note:     FH 35cm @ 28 weeks, US next visit    1/6/23:  EFW 47%, AC 43%, SHA 22.0 cm, vtx  2/9/2023: EFW 58%, AC 37%, SHA nl, BPP 8/8, vertex      Cystic fibrosis carrier 08/31/2022     Priority: Medium     Overview Note:     9/8/22:  ACOG info prev given to pt, D/W her FOB testing and/or genetic counseling -- pt declines      Multigravida in third trimester 08/09/2022     Priority: Medium     Overview Note:     EDC by 8 5/7 week US (not c/w LMP)    08/17/2022: NIPT no result, low fetal fraction. SMA, DMD, and Fragile X carrier screening negative  08/31/2022: NIPT negx3, female  9/29/22:  AFP only neg  1/6/2023: tdap given       Assessment & Plan Note:     PTL/labor precautions, [address], and pregnancy warning signs reviewed. Pt advised to call the office at [phone] or go straight to [facility] and Delivery at [facility] with any of the following concerns vaginal bleeding, leaking of fluid, jason regularly Q 5-7 minutes for over an hour or not feeling the baby move.    RTO 1 wk      Septate uterus affecting pregnancy in third trimester 2022     Priority: Medium     Overview Note:     2022: US today ? septate vs bicornuate uterus. . Pt denies previous dx of this or  any complications/PTL in previous pregnancies. Will recheck CL and reassess at 16 weeks    22: CL 5 cm  10/12/2022: CL 3.9 cm  10/27/22:  CL 4.35 cm, no funneling  22:  CL 4.7 cm, no funneling  23:  EFW 47%, AC 43%, SHA 22.0 cm, vtx  2023: EFW 58%, AC 37%, SHA nl, BPP , vertex      Cervical high risk HPV (human papillomavirus) test positive 2021     Overview Note:     On pap done 2020 in [place] and again 2021:  colpo neg           Problem List Items Addressed This Visit        Endocrine    Gestational diabetes mellitus (GDM) in third trimester controlled on oral hypoglycemic drug - Primary    Relevant Orders    AMB POC US FETAL BIOPHYSICAL PROFILE W/O NON STRESS TESTING (Completed)       Other    Anemia affecting pregnancy in third trimester     noted         Multigravida in third trimester     PTL/labor precautions, [address], and pregnancy warning signs reviewed. Pt advised to call the office at [phone] or go straight to Labor and Delivery at [facility] with any of the following concerns vaginal bleeding, leaking of fluid, jason regularly Q 5-7 minutes for over an hour or not feeling the baby move.    RTO 1 wk         Relevant Orders    AMB POC US FETAL BIOPHYSICAL PROFILE W/O NON STRESS TESTING (Completed)       Orders Placed This Encounter   Procedures    AMB POC US FETAL BIOPHYSICAL PROFILE W/O NON STRESS TESTING       Outpatient Encounter Medications as of 2023   Medication Sig Dispense Refill    Ascorbic Acid (VITAMIN C PO) Take by mouth      metFORMIN (GLUCOPHAGE) 500 MG tablet Take 1 tablet by mouth 2 times daily (with meals) 60 tablet 5    blood glucose monitor strips 1 strip by Other route 4 times daily Use four times daily and as needed to check blood sugar 200 strip 11    Blood Glucose Monitoring Suppl (ONETOUCH VERIO FLEX SYSTEM) w/Device KIT Use as directed to check blood sugar four times a day 1 kit 0    ferrous sulfate (IRON 325) 325 (65 Fe) MG tablet Take 1 tablet by mouth 2 times daily 60 tablet 5    Prenatal Vit-Fe Fumarate-FA (PRENATAL PO) Take by mouth       No facility-administered encounter medications on file as of 2/23/2023.                Labor signs, pregnancy warning signs, and fetal movement counting reviewed (if applicable)        JENNIFER Lozano CNP 02/23/23 9:51 AM

## 2023-02-23 NOTE — PATIENT INSTRUCTIONS
Thank you for coming  Return to the office in 1 week  Please call if you have any abdominal pain, & 5 contractions in 1 hour, vaginal bleeding or spotting, or leaking of fluid. You should feel the baby move 10 times in an hour. Monitor this once a day. if you do not feel the baby move this often please call  Please call immediately if you have a headache that won't go away with tylenol, changes to your vision like funny lights or blurriness, nausea or vomiting, upper abdominal pain, or if the baby does not move at least 10 times in 2 hours.   +

## 2023-02-23 NOTE — PROGRESS NOTES
Patient comes in today for routine prenatal visit. Patient states the pressure is more consistent.      Fetal Movement: Yes  Contractions: Yes, irregular  Vaginal Bleeding: No  Leaking Fluid: No  GI/: No    Vitals:    02/23/23 0928   BP: 132/70   Site: Left Upper Arm   Position: Sitting   Weight: 289 lb (131.1 kg)   Height: 5' 7\" (1.702 m)

## 2023-02-23 NOTE — ASSESSMENT & PLAN NOTE
PTL/labor precautions, [address], and pregnancy warning signs reviewed. Pt advised to call the office at [phone] or go straight to Labor and Delivery at [facility] with any of the following concerns vaginal bleeding, leaking of fluid, jason regularly Q 5-7 minutes for over an hour or not feeling the baby move.    RTO 1 wk

## 2023-02-24 NOTE — PROGRESS NOTES
I have reviewed the patient's visit today including history, exam and assessment by CORTNEY Hall. I agree with treatment/plan as above.     Gilbert Cano MD  9:47 AM  02/24/23

## 2023-03-02 ENCOUNTER — ROUTINE PRENATAL (OUTPATIENT)
Dept: OBGYN CLINIC | Age: 33
End: 2023-03-02

## 2023-03-02 VITALS
WEIGHT: 287 LBS | SYSTOLIC BLOOD PRESSURE: 128 MMHG | DIASTOLIC BLOOD PRESSURE: 72 MMHG | BODY MASS INDEX: 45.04 KG/M2 | HEIGHT: 67 IN

## 2023-03-02 DIAGNOSIS — R30.0 DYSURIA: ICD-10-CM

## 2023-03-02 DIAGNOSIS — R30.0 BURNING WITH URINATION: ICD-10-CM

## 2023-03-02 DIAGNOSIS — O99.013 ANEMIA AFFECTING PREGNANCY IN THIRD TRIMESTER: ICD-10-CM

## 2023-03-02 DIAGNOSIS — O24.415 GESTATIONAL DIABETES MELLITUS (GDM) IN THIRD TRIMESTER CONTROLLED ON ORAL HYPOGLYCEMIC DRUG: Primary | ICD-10-CM

## 2023-03-02 DIAGNOSIS — Z14.1 CYSTIC FIBROSIS CARRIER: ICD-10-CM

## 2023-03-02 DIAGNOSIS — O26.843 UTERINE SIZE DATE DISCREPANCY PREGNANCY, THIRD TRIMESTER: ICD-10-CM

## 2023-03-02 DIAGNOSIS — Z34.83 MULTIGRAVIDA IN THIRD TRIMESTER: ICD-10-CM

## 2023-03-02 LAB
BILIRUBIN, URINE, POC: NEGATIVE
BLOOD URINE, POC: ABNORMAL
GLUCOSE URINE, POC: NEGATIVE
KETONES, URINE, POC: NEGATIVE
LEUKOCYTE ESTERASE, URINE, POC: ABNORMAL
NITRITE, URINE, POC: NEGATIVE
PH, URINE, POC: 6 (ref 4.6–8)
PROTEIN,URINE, POC: NEGATIVE
SPECIFIC GRAVITY, URINE, POC: 1.01 (ref 1–1.03)
URINALYSIS CLARITY, POC: ABNORMAL
URINALYSIS COLOR, POC: YELLOW
UROBILINOGEN, POC: ABNORMAL

## 2023-03-02 ASSESSMENT — PATIENT HEALTH QUESTIONNAIRE - PHQ9
1. LITTLE INTEREST OR PLEASURE IN DOING THINGS: 0
9. THOUGHTS THAT YOU WOULD BE BETTER OFF DEAD, OR OF HURTING YOURSELF: 0
SUM OF ALL RESPONSES TO PHQ QUESTIONS 1-9: 0
8. MOVING OR SPEAKING SO SLOWLY THAT OTHER PEOPLE COULD HAVE NOTICED. OR THE OPPOSITE, BEING SO FIGETY OR RESTLESS THAT YOU HAVE BEEN MOVING AROUND A LOT MORE THAN USUAL: 0
SUM OF ALL RESPONSES TO PHQ QUESTIONS 1-9: 0
SUM OF ALL RESPONSES TO PHQ9 QUESTIONS 1 & 2: 0
SUM OF ALL RESPONSES TO PHQ QUESTIONS 1-9: 0
10. IF YOU CHECKED OFF ANY PROBLEMS, HOW DIFFICULT HAVE THESE PROBLEMS MADE IT FOR YOU TO DO YOUR WORK, TAKE CARE OF THINGS AT HOME, OR GET ALONG WITH OTHER PEOPLE: 0
2. FEELING DOWN, DEPRESSED OR HOPELESS: 0
7. TROUBLE CONCENTRATING ON THINGS, SUCH AS READING THE NEWSPAPER OR WATCHING TELEVISION: 0
4. FEELING TIRED OR HAVING LITTLE ENERGY: 0
5. POOR APPETITE OR OVEREATING: 0
3. TROUBLE FALLING OR STAYING ASLEEP: 0
6. FEELING BAD ABOUT YOURSELF - OR THAT YOU ARE A FAILURE OR HAVE LET YOURSELF OR YOUR FAMILY DOWN: 0
SUM OF ALL RESPONSES TO PHQ QUESTIONS 1-9: 0

## 2023-03-02 NOTE — ASSESSMENT & PLAN NOTE
PTL/labor precautions, [address], and pregnancy warning signs reviewed. Pt advised to call the office at [phone] or go straight to Labor and Delivery at [facility] with any of the following concerns vaginal bleeding, leaking of fluid, jason regularly Q 5-7 minutes for over an hour or not feeling the baby move.      Will call pt with details of IOL

## 2023-03-02 NOTE — PROGRESS NOTES
This is a 34 y.o.  [id number] at 38w0d for routine OB visit. Her Estimated Due Date is 3/16/2023, by Ultrasound    Denies leaking of fluid, vaginal bleeding, or regular contractions. Reports fetal movement. Had mucousy discharge. Some dysuria. Current Outpatient Medications on File Prior to Visit   Medication Sig Dispense Refill    Ascorbic Acid (VITAMIN C PO) Take by mouth      metFORMIN (GLUCOPHAGE) 500 MG tablet Take 1 tablet by mouth 2 times daily (with meals) 60 tablet 5    blood glucose monitor strips 1 strip by Other route 4 times daily Use four times daily and as needed to check blood sugar 200 strip 11    Blood Glucose Monitoring Suppl (ONETOUCH VERIO FLEX SYSTEM) w/Device KIT Use as directed to check blood sugar four times a day 1 kit 0    ferrous sulfate (IRON 325) 325 (65 Fe) MG tablet Take 1 tablet by mouth 2 times daily 60 tablet 5    Prenatal Vit-Fe Fumarate-FA (PRENATAL PO) Take by mouth       No current facility-administered medications on file prior to visit.        Allergies   Allergen Reactions    Penicillins Anaphylaxis       OB History    Para Term  AB Living   4 2 2 0 1 2   SAB IAB Ectopic Molar Multiple Live Births   1 0 0 0 0 2       # 1 - Date: 09, Sex: None, Weight: 8 lb 6 oz (3.799 kg), GA: 38w0d, Delivery: Vaginal, Spontaneous, Apgar1: None, Apgar5: None, Living: Living, Birth Comments: None    # 2 - Date: 04/30/15, Sex: Male, Weight: 6 lb 10 oz (3.005 kg), GA: 38w0d, Delivery: Vaginal, Spontaneous, Apgar1: None, Apgar5: None, Living: Living, Birth Comments: None    # 3 - Date: 2016, Sex: None, Weight: None, GA: None, Delivery: None, Apgar1: None, Apgar5: None, Living: None, Birth Comments: None    # 4 - Date: None, Sex: None, Weight: None, GA: None, Delivery: None, Apgar1: None, Apgar5: None, Living: None, Birth Comments: None        Past Medical History:   Diagnosis Date    Abnormal Papanicolaou smear of cervix     Dysthymia 2/3/2022       History reviewed. No pertinent surgical history. Family History   Problem Relation Age of Onset    Breast Cancer Neg Hx     Colon Cancer Neg Hx     Ovarian Cancer Neg Hx     Uterine Cancer Neg Hx        Social History     Socioeconomic History    Marital status: Single     Spouse name: Not on file    Number of children: Not on file    Years of education: Not on file    Highest education level: Not on file   Occupational History    Not on file   Tobacco Use    Smoking status: Never    Smokeless tobacco: Never   Substance and Sexual Activity    Alcohol use: No    Drug use: Not Currently    Sexual activity: Yes     Partners: Male     Birth control/protection: None   Other Topics Concern    Not on file   Social History Narrative    Abuse: Feels safe at home, no history of physical abuse, no history of sexual abuse      Social Determinants of Health     Financial Resource Strain: Low Risk     Difficulty of Paying Living Expenses: Not hard at all   Food Insecurity: No Food Insecurity    Worried About Running Out of Food in the Last Year: Never true    Marie of Food in the Last Year: Never true   Transportation Needs: Unknown    Lack of Transportation (Medical): Not on file    Lack of Transportation (Non-Medical):  No   Physical Activity: Not on file   Stress: Not on file   Social Connections: Not on file   Intimate Partner Violence: Not on file   Housing Stability: Unknown    Unable to Pay for Housing in the Last Year: Not on file    Number of Places Lived in the Last Year: Not on file    Unstable Housing in the Last Year: No           Objective    Vitals:    03/02/23 0825   BP: 128/72   Site: Right Upper Arm   Position: Sitting   Weight: 287 lb (130.2 kg)   Height: 5' 7\" (1.702 m)       General: well developed, well nourished, in no acute distress    Head: normocephalic and atraumatic    Resp: even and unlabored    Psych: Normal mood and affect        Assessment and Plan      Patient Active Problem List    Diagnosis Date Noted   • Anemia affecting pregnancy in third trimester 12/23/2022     Priority: Medium     Overview Note:     Add'l Iron    2/16/23: Hgb 10.5       Assessment & Plan Note:     noted     • Gestational diabetes mellitus (GDM) in third trimester controlled on oral hypoglycemic drug 12/23/2022     Priority: Medium     Overview Note:     Failed 1 hr glucola, 3 hr pending    1/19/2023: pt did not bring BS logs, states rx was not covered. New rx sent and pt advised to call office ASAP if not covered  1/23/23: no show for diabetes education  2/2/2023: [place] , 1hr 114-181. Starting Metformin 500 BID. Start weekly OBV with US  2/9/2023: did not bring BS logs, reports fastings 80-90s and 1 hr PP 100s-110s  2/16/2023: fastings 85-92, 1 hr pp , mostly all within target. Continue Metformin 500   2/23/2023: did nto bring BS logs, pt reports fastings 80-90s, 1hr PP 90s-100  3/2/23: [place] 90-90, 1hr HM40-937, continue Metformin 500 mg BID     • Uterine size date discrepancy pregnancy, third trimester 12/22/2022     Priority: Medium     Overview Note:     FH 35cm @ 28 weeks, US next visit    1/6/23:  EFW 47%, AC 43%, SHA 22.0 cm, vtx  2/9/2023: EFW 58%, AC 37%, SHA nl, BPP 8/8, vertex  3/2/2023: EFW 50%, AC 49%, SHA nl, BPP 8/8, vertex     • Cystic fibrosis carrier 08/31/2022     Priority: Medium     Overview Note:     9/8/22:  ACOG info prev given to pt, D/W her FOB testing and/or genetic counseling -- pt declines     • Multigravida in third trimester 08/09/2022     Priority: Medium     Overview Note:     EDC by 8 5/7 week US (not c/w LMP)    08/17/2022: NIPT no result, low fetal fraction.  SMA, DMD, and Fragile X carrier screening negative  08/31/2022: NIPT negx3, female  9/29/22:  AFP only neg  1/6/2023: tdap given       Assessment & Plan Note:     PTL/labor precautions, FMC, and pregnancy warning signs reviewed. Pt advised to call the office at [phone] or go straight to Labor and  Delivery at [facility] with any of the following concerns vaginal bleeding, leaking of fluid, jason regularly Q 5-7 minutes for over an hour or not feeling the baby move. 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 septate vs bicornuate uterus. . Pt denies previous dx of this or  any complications/PTL in previous pregnancies. Will recheck CL and reassess at 16 weeks    22: CL 5 cm  10/12/2022: CL 3.9 cm  10/27/22:  CL 4.35 cm, no funneling  22:  CL 4.7 cm, no funneling  23:  EFW 47%, AC 43%, SHA 22.0 cm, vtx  2023: EFW 58%, AC 37%, SHA nl, BPP 8/8, vertex      Cervical high risk HPV (human papillomavirus) test positive 2021     Overview Note:     On pap done 2020 in [place] and again 2021:  colpo neg           Problem List Items Addressed This Visit        Endocrine    Gestational diabetes mellitus (GDM) in third trimester controlled on oral hypoglycemic drug - Primary    Relevant Orders    AMB POC US OB RE-EVAL/FOLLOW UP (Completed)    AMB POC US FETAL BIOPHYSICAL PROFILE W/O NON STRESS TESTING (Completed)       Other    Anemia affecting pregnancy in third trimester     noted         Cystic fibrosis carrier    Relevant Orders    AMB POC US OB RE-EVAL/FOLLOW UP (Completed)    AMB POC US FETAL BIOPHYSICAL PROFILE W/O NON STRESS TESTING (Completed)    Multigravida in third trimester     PTL/labor precautions, [address], and pregnancy warning signs reviewed. Pt advised to call the office at [phone] or go straight to Labor and Delivery at [facility] with any of the following concerns vaginal bleeding, leaking of fluid, jason regularly Q 5-7 minutes for over an hour or not feeling the baby move.      Will call pt with details of IOL         Relevant Orders    AMB POC US OB RE-EVAL/FOLLOW UP (Completed)    AMB POC US FETAL BIOPHYSICAL PROFILE W/O NON STRESS TESTING (Completed)    Uterine size date discrepancy pregnancy, third trimester   Other Visit Diagnoses     Burning with urination        Relevant Orders    AMB POC URINALYSIS DIP STICK MANUAL W/O MICRO    Dysuria        Relevant Orders    AMB POC URINALYSIS DIP STICK AUTO W/ MICRO          Orders Placed This Encounter   Procedures    AMB POC US OB RE-EVAL/FOLLOW UP    AMB POC US FETAL BIOPHYSICAL PROFILE W/O NON STRESS TESTING    AMB POC URINALYSIS DIP STICK MANUAL W/O MICRO    AMB POC URINALYSIS DIP STICK AUTO W/ MICRO       Outpatient Encounter Medications as of 3/2/2023   Medication Sig Dispense Refill    Ascorbic Acid (VITAMIN C PO) Take by mouth      metFORMIN (GLUCOPHAGE) 500 MG tablet Take 1 tablet by mouth 2 times daily (with meals) 60 tablet 5    blood glucose monitor strips 1 strip by Other route 4 times daily Use four times daily and as needed to check blood sugar 200 strip 11    Blood Glucose Monitoring Suppl (ONETOUCH VERIO FLEX SYSTEM) w/Device KIT Use as directed to check blood sugar four times a day 1 kit 0    ferrous sulfate (IRON 325) 325 (65 Fe) MG tablet Take 1 tablet by mouth 2 times daily 60 tablet 5    Prenatal Vit-Fe Fumarate-FA (PRENATAL PO) Take by mouth       No facility-administered encounter medications on file as of 3/2/2023.                Labor signs, pregnancy warning signs, and fetal movement counting reviewed (if applicable)        JENNIFER Billings CNP 03/02/23 8:43 AM

## 2023-03-02 NOTE — PROGRESS NOTES
Patient comes in today for routine prenatal visit.     Burning while urinating started a few days ago.     Fetal Movement: Yes  Contractions: Yes-BH  Vaginal Bleeding: No  Leaking Fluid: No  GI/: No    Vitals:    03/02/23 0825   BP: 128/72   Site: Right Upper Arm   Position: Sitting   Weight: 287 lb (130.2 kg)   Height: 5' 7\" (1.702 m)

## 2023-03-03 ENCOUNTER — TELEPHONE (OUTPATIENT)
Dept: OBGYN CLINIC | Age: 33
End: 2023-03-03

## 2023-03-05 LAB
BACTERIA SPEC CULT: ABNORMAL
SERVICE CMNT-IMP: ABNORMAL

## 2023-03-06 NOTE — TELEPHONE ENCOUNTER
Urine Cx is POS for UTI (group b strep)      I see she is allergic to PCN. Can she take cephalosporins like keflex?     If so start Keflex 500 mg four times a day #28 RF:0

## 2023-03-06 NOTE — TELEPHONE ENCOUNTER
Patient was informed of results and recommendations. Patient states she is not sure if she has taken keflex before. I advised her that I would send a message to Magdalene to get recommendations. Patient voiced understanding. Patient did ask if she would get IV antibiotics during labor. I advised her that since her GBS was positive for Group Beta Strep she would get IV antibiotics during delivery.

## 2023-03-07 RX ORDER — GRANULES FOR ORAL 3 G/1
3 POWDER ORAL ONCE
Qty: 1 EACH | Refills: 0 | Status: SHIPPED | OUTPATIENT
Start: 2023-03-07 | End: 2023-03-07

## 2023-03-07 NOTE — TELEPHONE ENCOUNTER
Patient informed of the medication being sent and how to take it. Rx pend to be sent. Patient advised to check with pharmacy after 5 pm. Patient voiced understanding.  lulu

## 2023-03-07 NOTE — TELEPHONE ENCOUNTER
We can try fosfomycin 3 grams once Disp: 3g RF:0    She will mix it with 4 oz of fluid and drink the solution. She will also get antibiotics when in labor. If she continues to have UTI symptoms after labor, we can recheck a urine cx and will then have more options for treatment. Thanks.

## 2023-03-08 ENCOUNTER — TELEPHONE (OUTPATIENT)
Dept: OBGYN CLINIC | Age: 33
End: 2023-03-08

## 2023-03-08 NOTE — TELEPHONE ENCOUNTER
Received requests from Dr Jori Irizarry to move cytotec IOL from 3/9/23 to 3/10/23. I spoke with Vahid Fraire in L&D at [phone] and induction date was moved to cytotec IOL 3/10/23 Friday pm     I also called pt and informed her of change.

## 2023-03-09 ENCOUNTER — ROUTINE PRENATAL (OUTPATIENT)
Dept: OBGYN CLINIC | Age: 33
End: 2023-03-09

## 2023-03-09 ENCOUNTER — HOSPITAL ENCOUNTER (OUTPATIENT)
Age: 33
Discharge: HOME | End: 2023-03-09
Attending: OBSTETRICS & GYNECOLOGY | Admitting: OBSTETRICS & GYNECOLOGY
Payer: COMMERCIAL

## 2023-03-09 VITALS
SYSTOLIC BLOOD PRESSURE: 121 MMHG | RESPIRATION RATE: 20 BRPM | HEART RATE: 92 BPM | OXYGEN SATURATION: 99 % | TEMPERATURE: 98.4 F | DIASTOLIC BLOOD PRESSURE: 72 MMHG

## 2023-03-09 VITALS
WEIGHT: 286 LBS | DIASTOLIC BLOOD PRESSURE: 82 MMHG | HEIGHT: 67 IN | SYSTOLIC BLOOD PRESSURE: 126 MMHG | BODY MASS INDEX: 44.89 KG/M2

## 2023-03-09 DIAGNOSIS — Z34.83 MULTIGRAVIDA IN THIRD TRIMESTER: ICD-10-CM

## 2023-03-09 DIAGNOSIS — O24.415 GESTATIONAL DIABETES MELLITUS (GDM) IN THIRD TRIMESTER CONTROLLED ON ORAL HYPOGLYCEMIC DRUG: Primary | ICD-10-CM

## 2023-03-09 DIAGNOSIS — O34.03 SEPTATE UTERUS AFFECTING PREGNANCY IN THIRD TRIMESTER: ICD-10-CM

## 2023-03-09 DIAGNOSIS — Z14.1 CYSTIC FIBROSIS CARRIER: ICD-10-CM

## 2023-03-09 DIAGNOSIS — O99.013 ANEMIA AFFECTING PREGNANCY IN THIRD TRIMESTER: ICD-10-CM

## 2023-03-09 DIAGNOSIS — Q51.28 SEPTATE UTERUS AFFECTING PREGNANCY IN THIRD TRIMESTER: ICD-10-CM

## 2023-03-09 LAB
AMNISURE, POC: NEGATIVE
Lab: NORMAL
NEGATIVE QC PASS/FAIL: NORMAL
POSITIVE QC PASS/FAIL: NORMAL

## 2023-03-09 PROCEDURE — 99281 EMR DPT VST MAYX REQ PHY/QHP: CPT

## 2023-03-09 PROCEDURE — 99285 EMERGENCY DEPT VISIT HI MDM: CPT

## 2023-03-09 ASSESSMENT — PATIENT HEALTH QUESTIONNAIRE - PHQ9
SUM OF ALL RESPONSES TO PHQ9 QUESTIONS 1 & 2: 0
SUM OF ALL RESPONSES TO PHQ QUESTIONS 1-9: 0
SUM OF ALL RESPONSES TO PHQ QUESTIONS 1-9: 0
2. FEELING DOWN, DEPRESSED OR HOPELESS: 0
SUM OF ALL RESPONSES TO PHQ QUESTIONS 1-9: 0
SUM OF ALL RESPONSES TO PHQ QUESTIONS 1-9: 0
1. LITTLE INTEREST OR PLEASURE IN DOING THINGS: 0

## 2023-03-09 NOTE — DISCHARGE INSTRUCTIONS
Pregnancy Precautions: Care Instructions  Your Care Instructions     There is no sure way to prevent labor before your due date ( labor) or to prevent most other pregnancy problems. But there are things you can do to increase your chances of a healthy pregnancy. Go to your appointments, follow your doctor's advice, and take good care of yourself. Eat well, and exercise (if your doctor agrees). And make sure to drink plenty of water. Follow-up care is a key part of your treatment and safety. Be sure to make and go to all appointments, and call your doctor if you are having problems. It's also a good idea to know your test results and keep a list of the medicines you take. How can you care for yourself at home? Make sure you go to your prenatal appointments. At each visit, your doctor will check your blood pressure. Your doctor will also check to see if you have protein in your urine. High blood pressure and protein in urine are signs of preeclampsia. This condition can be dangerous for you and your baby. Drink plenty of fluids. Dehydration can cause contractions. If you have kidney, heart, or liver disease and have to limit fluids, talk with your doctor before you increase the amount of fluids you drink. Tell your doctor right away if you notice any symptoms of an infection, such as:  Burning when you urinate. A foul-smelling discharge from your vagina. Vaginal itching. Unexplained fever. Unusual pain or soreness in your uterus or lower belly. Eat a balanced diet. Include plenty of foods that are high in calcium and iron. Foods high in calcium include milk, cheese, yogurt, almonds, and broccoli. Foods high in iron include red meat, shellfish, poultry, eggs, beans, raisins, whole-grain bread, and leafy green vegetables. Do not smoke. If you need help quitting, talk to your doctor about stop-smoking programs and medicines. These can increase your chances of quitting for good.   Do not drink alcohol or use marijuana or illegal drugs. Follow your doctor's directions about activity. Your doctor will let you know how much, if any, exercise you can do. Ask your doctor if you can have sex. If you are at risk for early labor, your doctor may ask you to not have sex. Take care to prevent falls. During pregnancy, your joints are loose, and your balance is off. Sports such as bicycling, skiing, or in-line skating can increase your risk of falling. And don't ride horses or motorcycles, dive, water ski, scuba dive, or parachute jump while you are pregnant. Avoid things that can make your body too hot and may be harmful to your baby, such as a hot tub or sauna. Or talk with your doctor before doing anything that raises your body temperature. Your doctor can tell you if it's safe. Do not take any over-the-counter or herbal medicines or supplements without talking to your doctor or pharmacist first.  When should you call for help? Call 911  anytime you think you may need emergency care. For example, call if:    You passed out (lost consciousness). You have a seizure. You have severe vaginal bleeding. You have severe pain in your belly or pelvis. You have had fluid gushing or leaking from your vagina and you know or think the umbilical cord is bulging into your vagina. If this happens, immediately get down on your knees so your rear end (buttocks) is higher than your head. This will decrease the pressure on the cord until help arrives. Call your doctor now or seek immediate medical care if:    You have signs of preeclampsia, such as:  Sudden swelling of your face, hands, or feet. New vision problems (such as dimness, blurring, or seeing spots). A severe headache. You have any vaginal bleeding. You have belly pain or cramping. You have a fever. You have had regular contractions (with or without pain) for an hour.  This means that you have 8 or more within 1 hour or 4 or more in 20 minutes after you change your position and drink fluids. You have a sudden release of fluid from your vagina. You have low back pain or pelvic pressure that does not go away. You notice that your baby has stopped moving or is moving much less than normal.   Watch closely for changes in your health, and be sure to contact your doctor if you have any problems. Where can you learn more? Go to http://www.woods.com/ and enter Y951 to learn more about \"Pregnancy Precautions: Care Instructions. \"  Current as of: February 23, 2022               Content Version: 13.5  © 0814-9441 [employer]. Care instructions adapted under license by [facility] ([facility]). If you have questions about a medical condition or this instruction, always ask your healthcare professional. [address] any warranty or liability for your use of this information.

## 2023-03-09 NOTE — PROGRESS NOTES
Pt walked for ~1 hour. No cervical change noted. Internal=1.5cm, external=3cm. Orders received from Dr. Cox to discharge home. Pt to follow-up with primary MD this am and scheduled IOL tonight.

## 2023-03-09 NOTE — PROGRESS NOTES
Discharge instruction given. Information/teaching printout given to patient. States understanding. All questions answered. Informed pt to return to hospital for decreased fetal movement, if she suspects her water breaks, if vaginal bleeding occurs that is more than spotting, or she starts to experience painful regular contractions 4-5 minutes apart. Pt verbalizes understanding. Discussed importance of follow up with primary MD. Ambulate out to personal auto with mother and sister at side. Pt stable at discharge.

## 2023-03-09 NOTE — PROGRESS NOTES
Patient comes in today for routine prenatal visit. Pt reports contractions started last night and she went to the hospital and she was dilated 2 cm but they sent her home. Pt still having contractions at this time. Pt also reports she was in the shower this morning and she felt fluid come out of her.      Fetal Movement: Yes  Contractions: Yes  Vaginal Bleeding: No  Leaking Fluid: No  GI/: No    Vitals:    03/09/23 0813   BP: 126/82   Site: Left Upper Arm   Position: Sitting   Weight: 286 lb (129.7 kg)   Height: 5' 7\" (1.702 m)

## 2023-03-09 NOTE — PROGRESS NOTES
OB ED     Pt to [facility] with complaints of ctx starting at 2230. EFM and TOCO applied. Abd palpated soft. Positive fetal movement noted, denies LOF or VB. Denies recent Covid exposure or symptoms.  [facility] notified of patient's arrival.   SVE 2/50/-3

## 2023-03-09 NOTE — PROGRESS NOTES
This is a 34 y.o.  [id number] at 39w0d for routine OB visit. Her Estimated Due Date is 3/16/2023, by Ultrasound    Denies  vaginal bleeding, or regular contractions. Reports fetal movement. ?LOF this morning in shower. Current Outpatient Medications on File Prior to Visit   Medication Sig Dispense Refill    Ascorbic Acid (VITAMIN C PO) Take by mouth      metFORMIN (GLUCOPHAGE) 500 MG tablet Take 1 tablet by mouth 2 times daily (with meals) 60 tablet 5    blood glucose monitor strips 1 strip by Other route 4 times daily Use four times daily and as needed to check blood sugar 200 strip 11    Blood Glucose Monitoring Suppl (ONETOUCH VERIO FLEX SYSTEM) w/Device KIT Use as directed to check blood sugar four times a day 1 kit 0    ferrous sulfate (IRON 325) 325 (65 Fe) MG tablet Take 1 tablet by mouth 2 times daily 60 tablet 5    Prenatal Vit-Fe Fumarate-FA (PRENATAL PO) Take by mouth       No current facility-administered medications on file prior to visit. Allergies   Allergen Reactions    Penicillins Anaphylaxis       OB History    Para Term  AB Living   4 2 2 0 1 2   SAB IAB Ectopic Molar Multiple Live Births   1 0 0 0 0 2       # 1 - Date: 09, Sex: None, Weight: 8 lb 6 oz (3.799 kg), GA: 38w0d, Delivery: Vaginal, Spontaneous, Apgar1: None, Apgar5: None, Living: Living, Birth Comments: None    # 2 - Date: 04/30/15, Sex: Male, Weight: 6 lb 10 oz (3.005 kg), GA: 38w0d, Delivery: Vaginal, Spontaneous, Apgar1: None, Apgar5: None, Living: Living, Birth Comments: None    # 3 - Date: 2016, Sex: None, Weight: None, GA: None, Delivery: None, Apgar1: None, Apgar5: None, Living: None, Birth Comments: None    # 4 - Date: None, Sex: None, Weight: None, GA: None, Delivery: None, Apgar1: None, Apgar5: None, Living: None, Birth Comments: None        Past Medical History:   Diagnosis Date    Abnormal Papanicolaou smear of cervix     Dysthymia 2/3/2022       History reviewed.  No pertinent surgical history. Family History   Problem Relation Age of Onset    Breast Cancer Neg Hx     Colon Cancer Neg Hx     Ovarian Cancer Neg Hx     Uterine Cancer Neg Hx        Social History     Socioeconomic History    Marital status: Single     Spouse name: Not on file    Number of children: Not on file    Years of education: Not on file    Highest education level: Not on file   Occupational History    Not on file   Tobacco Use    Smoking status: Never    Smokeless tobacco: Never   Substance and Sexual Activity    Alcohol use: No    Drug use: Not Currently    Sexual activity: Yes     Partners: Male     Birth control/protection: None   Other Topics Concern    Not on file   Social History Narrative    Abuse: Feels safe at home, no history of physical abuse, no history of sexual abuse      Social Determinants of Health     Financial Resource Strain: Low Risk     Difficulty of Paying Living Expenses: Not hard at all   Food Insecurity: No Food Insecurity    Worried About Running Out of Food in the Last Year: Never true    Marie of Food in the Last Year: Never true   Transportation Needs: Unknown    Lack of Transportation (Medical): Not on file    Lack of Transportation (Non-Medical):  No   Physical Activity: Not on file   Stress: Not on file   Social Connections: Not on file   Intimate Partner Violence: Not on file   Housing Stability: Unknown    Unable to Pay for Housing in the Last Year: Not on file    Number of Places Lived in the Last Year: Not on file    Unstable Housing in the Last Year: No           Objective    Vitals:    03/09/23 0813   BP: 126/82   Site: Left Upper Arm   Position: Sitting   Weight: 286 lb (129.7 kg)   Height: 5' 7\" (1.702 m)       General: well developed, well nourished, in no acute distress    Head: normocephalic and atraumatic    Resp: even and unlabored    Psych: Normal mood and affect        Assessment and Plan      Patient Active Problem List    Diagnosis Date Noted    Anemia affecting pregnancy in third trimester 12/23/2022     Priority: Medium     Overview Note:     Add'l Iron    2/16/23: Hgb 10.5       Assessment & Plan Note:     noted      Gestational diabetes mellitus (GDM) in third trimester controlled on oral hypoglycemic drug 12/23/2022     Priority: Medium     Overview Note:     Failed 1 hr glucola, 3 hr pending    1/19/2023: pt did not bring BS logs, states rx was not covered. New rx sent and pt advised to call office ASAP if not covered  1/23/23: no show for diabetes education  2/2/2023: Fastings , 1hr 114-181. Starting Metformin 500 BID. Start weekly OBV with US  2/9/2023: did not bring BS logs, reports fastings 80-90s and 1 hr PP 100s-110s  2/16/2023: fastings 85-92, 1 hr pp , mostly all within target. Continue Metformin 500   2/23/2023: did nto bring BS logs, pt reports fastings 80-90s, 1hr PP 90s-100  3/2/23: Fastings 90-90, 1hr UQ55-290, continue Metformin 500 mg BID  3/9/2023: no logs today       Assessment & Plan Note:            Uterine size date discrepancy pregnancy, third trimester 12/22/2022     Priority: Medium     Overview Note:     FH 35cm @ 28 weeks, US next visit    1/6/23:  EFW 47%, AC 43%, SHA 22.0 cm, vtx  2/9/2023: EFW 58%, AC 37%, SHA nl, BPP 8/8, vertex  3/2/2023: EFW 50%, AC 49%, SHA nl, BPP 8/8, vertex      Cystic fibrosis carrier 08/31/2022     Priority: Medium     Overview Note:     9/8/22:  ACOG info prev given to pt, D/W her FOB testing and/or genetic counseling -- pt declines      Multigravida in third trimester 08/09/2022     Priority: Medium     Overview Note:     EDC by 8 5/7 week US (not c/w LMP)    08/17/2022: NIPT no result, low fetal fraction. SMA, DMD, and Fragile X carrier screening negative  08/31/2022: NIPT negx3, female  9/29/22:  AFP only neg  1/6/2023: tdap given       Assessment & Plan Note:     PTL/labor precautions, [facility], and pregnancy warning signs reviewed.  Pt advised to call the office at [phone] or go straight to Labor and Delivery at [facility] with any of the following concerns vaginal bleeding, leaking of fluid, jason regularly Q 5-7 minutes for over an hour or not feeling the baby move. RTO 6 wk PPV    ? LOF, SHA nl, nitrazine inconclusive, to L&D for amnisure      Septate uterus affecting pregnancy in third trimester 2022     Priority: Medium     Overview Note:     2022: US today ? septate vs bicornuate uterus. . Pt denies previous dx of this or  any complications/PTL in previous pregnancies. Will recheck CL and reassess at 16 weeks    22: CL 5 cm  10/12/2022: CL 3.9 cm  10/27/22:  CL 4.35 cm, no funneling  22:  CL 4.7 cm, no funneling  23:  EFW 47%, AC 43%, SHA 22.0 cm, vtx  2023: EFW 58%, AC 37%, SHA nl, BPP 8/8, vertex      Cervical high risk HPV (human papillomavirus) test positive 2021     Overview Note:     On pap done 2020 in [place] and again 2021:  colpo neg           Problem List Items Addressed This Visit        Endocrine    Gestational diabetes mellitus (GDM) in third trimester controlled on oral hypoglycemic drug - Primary               Relevant Orders    AMB POC US FETAL BIOPHYSICAL PROFILE W/O NON STRESS TESTING (Completed)       Genitourinary    Septate uterus affecting pregnancy in third trimester       Other    Anemia affecting pregnancy in third trimester     noted         Cystic fibrosis carrier    Multigravida in third trimester     PTL/labor precautions, [address], and pregnancy warning signs reviewed. Pt advised to call the office at [phone] or go straight to Labor and Delivery at [facility] with any of the following concerns vaginal bleeding, leaking of fluid, jason regularly Q 5-7 minutes for over an hour or not feeling the baby move. RTO 6 wk PPV    ? LOF, SHA nl, nitrazine inconclusive, to L&D for amnisure         Relevant Orders    AMB POC US FETAL BIOPHYSICAL PROFILE W/O NON STRESS TESTING (Completed)       Orders Placed This Encounter   Procedures    AMB POC US FETAL BIOPHYSICAL PROFILE W/O NON STRESS TESTING       Outpatient Encounter Medications as of 3/9/2023   Medication Sig Dispense Refill    Ascorbic Acid (VITAMIN C PO) Take by mouth      metFORMIN (GLUCOPHAGE) 500 MG tablet Take 1 tablet by mouth 2 times daily (with meals) 60 tablet 5    blood glucose monitor strips 1 strip by Other route 4 times daily Use four times daily and as needed to check blood sugar 200 strip 11    Blood Glucose Monitoring Suppl (ONETOUCH VERIO FLEX SYSTEM) w/Device KIT Use as directed to check blood sugar four times a day 1 kit 0    ferrous sulfate (IRON 325) 325 (65 Fe) MG tablet Take 1 tablet by mouth 2 times daily 60 tablet 5    Prenatal Vit-Fe Fumarate-FA (PRENATAL PO) Take by mouth       No facility-administered encounter medications on file as of 3/9/2023.                Labor signs, pregnancy warning signs, and fetal movement counting reviewed (if applicable)        JENNIFER Koenig CNP 03/09/23 8:39 AM

## 2023-03-09 NOTE — ASSESSMENT & PLAN NOTE
PTL/labor precautions, [address], and pregnancy warning signs reviewed. Pt advised to call the office at [phone] or go straight to Labor and Delivery at [facility] with any of the following concerns vaginal bleeding, leaking of fluid, jason regularly Q 5-7 minutes for over an hour or not feeling the baby move. RTO 6 wk PPV    ? LOF, SHA nl, nitrazine inconclusive, to L&D for [facility]

## 2023-03-10 ENCOUNTER — HOSPITAL ENCOUNTER (INPATIENT)
Age: 33
LOS: 3 days | Discharge: HOME | End: 2023-03-13
Attending: OBSTETRICS & GYNECOLOGY | Admitting: OBSTETRICS & GYNECOLOGY
Payer: COMMERCIAL

## 2023-03-10 LAB
ABO + RH BLD: NORMAL
BASOPHILS # BLD: 0 K/UL (ref 0–0.2)
BASOPHILS NFR BLD: 0 % (ref 0–2)
BLOOD GROUP ANTIBODIES SERPL: NORMAL
DIFFERENTIAL METHOD BLD: ABNORMAL
EOSINOPHIL # BLD: 0.1 K/UL (ref 0–0.8)
EOSINOPHIL NFR BLD: 1 % (ref 0.5–7.8)
ERYTHROCYTE [DISTWIDTH] IN BLOOD BY AUTOMATED COUNT: 15.3 % (ref 11.9–14.6)
GLUCOSE BLD STRIP.AUTO-MCNC: 91 MG/DL (ref 65–100)
HCT VFR BLD AUTO: 33.1 % (ref 35.8–46.3)
HGB BLD-MCNC: 10.9 G/DL (ref 11.7–15.4)
IMM GRANULOCYTES # BLD AUTO: 0 K/UL (ref 0–0.5)
IMM GRANULOCYTES NFR BLD AUTO: 0 % (ref 0–5)
LYMPHOCYTES # BLD: 2.2 K/UL (ref 0.5–4.6)
LYMPHOCYTES NFR BLD: 27 % (ref 13–44)
MCH RBC QN AUTO: 26.9 PG (ref 26.1–32.9)
MCHC RBC AUTO-ENTMCNC: 32.9 G/DL (ref 31.4–35)
MCV RBC AUTO: 81.7 FL (ref 82–102)
MONOCYTES # BLD: 0.6 K/UL (ref 0.1–1.3)
MONOCYTES NFR BLD: 7 % (ref 4–12)
NEUTS SEG # BLD: 5.2 K/UL (ref 1.7–8.2)
NEUTS SEG NFR BLD: 64 % (ref 43–78)
NRBC # BLD: 0 K/UL (ref 0–0.2)
PLATELET # BLD AUTO: 372 K/UL (ref 150–450)
PMV BLD AUTO: 9.9 FL (ref 9.4–12.3)
RBC # BLD AUTO: 4.05 M/UL (ref 4.05–5.2)
SERVICE CMNT-IMP: NORMAL
SPECIMEN EXP DATE BLD: NORMAL
WBC # BLD AUTO: 8.1 K/UL (ref 4.3–11.1)

## 2023-03-10 PROCEDURE — 85025 COMPLETE CBC W/AUTO DIFF WBC: CPT

## 2023-03-10 PROCEDURE — 3E0P7VZ INTRODUCTION OF HORMONE INTO FEMALE REPRODUCTIVE, VIA NATURAL OR ARTIFICIAL OPENING: ICD-10-PCS | Performed by: OBSTETRICS & GYNECOLOGY

## 2023-03-10 PROCEDURE — 36415 COLL VENOUS BLD VENIPUNCTURE: CPT

## 2023-03-10 PROCEDURE — 2580000003 HC RX 258: Performed by: OBSTETRICS & GYNECOLOGY

## 2023-03-10 PROCEDURE — 6360000002 HC RX W HCPCS: Performed by: OBSTETRICS & GYNECOLOGY

## 2023-03-10 PROCEDURE — 6370000000 HC RX 637 (ALT 250 FOR IP): Performed by: OBSTETRICS & GYNECOLOGY

## 2023-03-10 PROCEDURE — 1100000000 HC RM PRIVATE

## 2023-03-10 PROCEDURE — 82962 GLUCOSE BLOOD TEST: CPT

## 2023-03-10 PROCEDURE — 86901 BLOOD TYPING SEROLOGIC RH(D): CPT

## 2023-03-10 RX ORDER — MISOPROSTOL 200 UG/1
800 TABLET ORAL
Status: DISCONTINUED | OUTPATIENT
Start: 2023-03-10 | End: 2023-03-11

## 2023-03-10 RX ORDER — SODIUM CHLORIDE, SODIUM LACTATE, POTASSIUM CHLORIDE, AND CALCIUM CHLORIDE .6; .31; .03; .02 G/100ML; G/100ML; G/100ML; G/100ML
1000 INJECTION, SOLUTION INTRAVENOUS PRN
Status: DISCONTINUED | OUTPATIENT
Start: 2023-03-10 | End: 2023-03-11

## 2023-03-10 RX ORDER — SODIUM CHLORIDE 0.9 % (FLUSH) 0.9 %
5-40 SYRINGE (ML) INJECTION EVERY 12 HOURS SCHEDULED
Status: DISCONTINUED | OUTPATIENT
Start: 2023-03-10 | End: 2023-03-11

## 2023-03-10 RX ORDER — FAMOTIDINE 20 MG/1
20 TABLET, FILM COATED ORAL 2 TIMES DAILY PRN
Status: DISCONTINUED | OUTPATIENT
Start: 2023-03-10 | End: 2023-03-11

## 2023-03-10 RX ORDER — TRANEXAMIC ACID 10 MG/ML
1000 INJECTION, SOLUTION INTRAVENOUS
Status: DISCONTINUED | OUTPATIENT
Start: 2023-03-10 | End: 2023-03-11

## 2023-03-10 RX ORDER — SODIUM CHLORIDE, SODIUM LACTATE, POTASSIUM CHLORIDE, AND CALCIUM CHLORIDE .6; .31; .03; .02 G/100ML; G/100ML; G/100ML; G/100ML
500 INJECTION, SOLUTION INTRAVENOUS PRN
Status: DISCONTINUED | OUTPATIENT
Start: 2023-03-10 | End: 2023-03-11

## 2023-03-10 RX ORDER — DEXTROSE, SODIUM CHLORIDE, SODIUM LACTATE, POTASSIUM CHLORIDE, AND CALCIUM CHLORIDE 5; .6; .31; .03; .02 G/100ML; G/100ML; G/100ML; G/100ML; G/100ML
INJECTION, SOLUTION INTRAVENOUS CONTINUOUS
Status: DISCONTINUED | OUTPATIENT
Start: 2023-03-10 | End: 2023-03-11

## 2023-03-10 RX ORDER — ZOLPIDEM TARTRATE 5 MG/1
5 TABLET ORAL NIGHTLY PRN
Status: DISCONTINUED | OUTPATIENT
Start: 2023-03-10 | End: 2023-03-11

## 2023-03-10 RX ORDER — METHYLERGONOVINE MALEATE 0.2 MG/ML
200 INJECTION INTRAVENOUS
Status: DISCONTINUED | OUTPATIENT
Start: 2023-03-10 | End: 2023-03-11

## 2023-03-10 RX ORDER — ACETAMINOPHEN 325 MG/1
650 TABLET ORAL EVERY 4 HOURS PRN
Status: DISCONTINUED | OUTPATIENT
Start: 2023-03-10 | End: 2023-03-11

## 2023-03-10 RX ORDER — CARBOPROST TROMETHAMINE 250 UG/ML
250 INJECTION, SOLUTION INTRAMUSCULAR
Status: DISCONTINUED | OUTPATIENT
Start: 2023-03-10 | End: 2023-03-11

## 2023-03-10 RX ORDER — CALCIUM CARBONATE 200(500)MG
500 TABLET,CHEWABLE ORAL 3 TIMES DAILY PRN
Status: DISCONTINUED | OUTPATIENT
Start: 2023-03-10 | End: 2023-03-11

## 2023-03-10 RX ORDER — TERBUTALINE SULFATE 1 MG/ML
0.25 INJECTION, SOLUTION SUBCUTANEOUS
Status: DISCONTINUED | OUTPATIENT
Start: 2023-03-10 | End: 2023-03-11

## 2023-03-10 RX ORDER — ONDANSETRON 2 MG/ML
4 INJECTION INTRAMUSCULAR; INTRAVENOUS EVERY 6 HOURS PRN
Status: DISCONTINUED | OUTPATIENT
Start: 2023-03-10 | End: 2023-03-11

## 2023-03-10 RX ORDER — SODIUM CHLORIDE 0.9 % (FLUSH) 0.9 %
5-40 SYRINGE (ML) INJECTION PRN
Status: DISCONTINUED | OUTPATIENT
Start: 2023-03-10 | End: 2023-03-11

## 2023-03-10 RX ORDER — DOCUSATE SODIUM 100 MG/1
100 CAPSULE, LIQUID FILLED ORAL 2 TIMES DAILY
Status: DISCONTINUED | OUTPATIENT
Start: 2023-03-10 | End: 2023-03-11

## 2023-03-10 RX ORDER — SODIUM CHLORIDE 9 MG/ML
25 INJECTION, SOLUTION INTRAVENOUS PRN
Status: DISCONTINUED | OUTPATIENT
Start: 2023-03-10 | End: 2023-03-11

## 2023-03-10 RX ADMIN — Medication 50 MCG: at 19:00

## 2023-03-10 RX ADMIN — VANCOMYCIN HYDROCHLORIDE 2000 MG: 10 INJECTION, POWDER, LYOPHILIZED, FOR SOLUTION INTRAVENOUS at 19:00

## 2023-03-10 RX ADMIN — ACETAMINOPHEN 650 MG: 325 TABLET, FILM COATED ORAL at 21:12

## 2023-03-10 ASSESSMENT — PAIN SCALES - GENERAL: PAINLEVEL_OUTOF10: 6

## 2023-03-11 ENCOUNTER — ANESTHESIA (OUTPATIENT)
Dept: LABOR AND DELIVERY | Age: 33
End: 2023-03-11
Payer: COMMERCIAL

## 2023-03-11 ENCOUNTER — ANESTHESIA EVENT (OUTPATIENT)
Dept: LABOR AND DELIVERY | Age: 33
End: 2023-03-11
Payer: COMMERCIAL

## 2023-03-11 LAB
BASE DEFICIT BLD-SCNC: 2.1 MMOL/L
BASE DEFICIT BLD-SCNC: 4.6 MMOL/L
GLUCOSE BLD STRIP.AUTO-MCNC: 88 MG/DL (ref 65–100)
GLUCOSE BLD STRIP.AUTO-MCNC: 97 MG/DL (ref 65–100)
HCO3 BLD-SCNC: 23.6 MMOL/L (ref 22–26)
HCO3 BLDV-SCNC: 22.8 MMOL/L (ref 23–28)
PCO2 BLDCO: 39 MMHG (ref 32–68)
PCO2 BLDCO: 56 MMHG (ref 32–68)
PH BLDCO: 7.23 (ref 7.15–7.38)
PH BLDCO: 7.38 (ref 7.15–7.38)
PO2 BLDCO: 24 MMHG
PO2 BLDCO: 38 MMHG
SAO2 % BLD: 33 % (ref 95–98)
SAO2 % BLDV: 70.4 % (ref 65–88)
SERVICE CMNT-IMP: ABNORMAL
SERVICE CMNT-IMP: ABNORMAL
SERVICE CMNT-IMP: NORMAL
SERVICE CMNT-IMP: NORMAL
SPECIMEN TYPE: ABNORMAL
SPECIMEN TYPE: ABNORMAL

## 2023-03-11 PROCEDURE — 7210000100 HC LABOR FEE PER 1 HR

## 2023-03-11 PROCEDURE — 0UQMXZZ REPAIR VULVA, EXTERNAL APPROACH: ICD-10-PCS | Performed by: OBSTETRICS & GYNECOLOGY

## 2023-03-11 PROCEDURE — 2580000003 HC RX 258: Performed by: OBSTETRICS & GYNECOLOGY

## 2023-03-11 PROCEDURE — 82803 BLOOD GASES ANY COMBINATION: CPT

## 2023-03-11 PROCEDURE — 2500000003 HC RX 250 WO HCPCS: Performed by: REGISTERED NURSE

## 2023-03-11 PROCEDURE — 7100000011 HC PHASE II RECOVERY - ADDTL 15 MIN

## 2023-03-11 PROCEDURE — 6360000002 HC RX W HCPCS: Performed by: OBSTETRICS & GYNECOLOGY

## 2023-03-11 PROCEDURE — 0UQGXZZ REPAIR VAGINA, EXTERNAL APPROACH: ICD-10-PCS | Performed by: OBSTETRICS & GYNECOLOGY

## 2023-03-11 PROCEDURE — 00HU33Z INSERTION OF INFUSION DEVICE INTO SPINAL CANAL, PERCUTANEOUS APPROACH: ICD-10-PCS | Performed by: ANESTHESIOLOGY

## 2023-03-11 PROCEDURE — 1100000000 HC RM PRIVATE

## 2023-03-11 PROCEDURE — 59400 OBSTETRICAL CARE: CPT | Performed by: OBSTETRICS & GYNECOLOGY

## 2023-03-11 PROCEDURE — 6370000000 HC RX 637 (ALT 250 FOR IP): Performed by: OBSTETRICS & GYNECOLOGY

## 2023-03-11 PROCEDURE — 7100000010 HC PHASE II RECOVERY - FIRST 15 MIN

## 2023-03-11 PROCEDURE — 7220000101 HC DELIVERY VAGINAL/SINGLE

## 2023-03-11 PROCEDURE — 51701 INSERT BLADDER CATHETER: CPT

## 2023-03-11 PROCEDURE — 3700000025 EPIDURAL BLOCK: Performed by: ANESTHESIOLOGY

## 2023-03-11 PROCEDURE — 6360000002 HC RX W HCPCS: Performed by: REGISTERED NURSE

## 2023-03-11 PROCEDURE — 82962 GLUCOSE BLOOD TEST: CPT

## 2023-03-11 RX ORDER — OXYCODONE HYDROCHLORIDE 5 MG/1
5 TABLET ORAL EVERY 4 HOURS PRN
Status: DISCONTINUED | OUTPATIENT
Start: 2023-03-11 | End: 2023-03-13 | Stop reason: HOSPADM

## 2023-03-11 RX ORDER — EPHEDRINE SULFATE/0.9% NACL/PF 50 MG/5 ML
SYRINGE (ML) INTRAVENOUS PRN
Status: DISCONTINUED | OUTPATIENT
Start: 2023-03-11 | End: 2023-03-11 | Stop reason: SDUPTHER

## 2023-03-11 RX ORDER — SIMETHICONE 80 MG
80 TABLET,CHEWABLE ORAL EVERY 6 HOURS PRN
Status: DISCONTINUED | OUTPATIENT
Start: 2023-03-11 | End: 2023-03-13 | Stop reason: HOSPADM

## 2023-03-11 RX ORDER — FAMOTIDINE 20 MG/1
20 TABLET, FILM COATED ORAL 2 TIMES DAILY PRN
Status: DISCONTINUED | OUTPATIENT
Start: 2023-03-11 | End: 2023-03-13 | Stop reason: HOSPADM

## 2023-03-11 RX ORDER — MISOPROSTOL 200 UG/1
200 TABLET ORAL PRN
Status: DISCONTINUED | OUTPATIENT
Start: 2023-03-11 | End: 2023-03-13 | Stop reason: HOSPADM

## 2023-03-11 RX ORDER — ONDANSETRON 8 MG/1
8 TABLET, ORALLY DISINTEGRATING ORAL EVERY 8 HOURS PRN
Status: DISCONTINUED | OUTPATIENT
Start: 2023-03-11 | End: 2023-03-13 | Stop reason: HOSPADM

## 2023-03-11 RX ORDER — IBUPROFEN 600 MG/1
600 TABLET ORAL EVERY 6 HOURS PRN
Status: DISCONTINUED | OUTPATIENT
Start: 2023-03-11 | End: 2023-03-13 | Stop reason: HOSPADM

## 2023-03-11 RX ORDER — SODIUM CHLORIDE 0.9 % (FLUSH) 0.9 %
5-40 SYRINGE (ML) INJECTION PRN
Status: DISCONTINUED | OUTPATIENT
Start: 2023-03-11 | End: 2023-03-13 | Stop reason: HOSPADM

## 2023-03-11 RX ORDER — ROPIVACAINE HYDROCHLORIDE 2 MG/ML
INJECTION, SOLUTION EPIDURAL; INFILTRATION; PERINEURAL PRN
Status: DISCONTINUED | OUTPATIENT
Start: 2023-03-11 | End: 2023-03-11 | Stop reason: SDUPTHER

## 2023-03-11 RX ORDER — DOCUSATE SODIUM 100 MG/1
100 CAPSULE, LIQUID FILLED ORAL 2 TIMES DAILY
Status: DISCONTINUED | OUTPATIENT
Start: 2023-03-11 | End: 2023-03-13 | Stop reason: HOSPADM

## 2023-03-11 RX ORDER — ACETAMINOPHEN 500 MG
1000 TABLET ORAL EVERY 6 HOURS PRN
Status: DISCONTINUED | OUTPATIENT
Start: 2023-03-11 | End: 2023-03-13 | Stop reason: HOSPADM

## 2023-03-11 RX ORDER — SODIUM CHLORIDE 9 MG/ML
INJECTION, SOLUTION INTRAVENOUS PRN
Status: DISCONTINUED | OUTPATIENT
Start: 2023-03-11 | End: 2023-03-13 | Stop reason: HOSPADM

## 2023-03-11 RX ORDER — ROPIVACAINE HYDROCHLORIDE 2 MG/ML
INJECTION, SOLUTION EPIDURAL; INFILTRATION; PERINEURAL CONTINUOUS PRN
Status: DISCONTINUED | OUTPATIENT
Start: 2023-03-11 | End: 2023-03-11 | Stop reason: SDUPTHER

## 2023-03-11 RX ORDER — LANOLIN
CREAM (ML) TOPICAL PRN
Status: DISCONTINUED | OUTPATIENT
Start: 2023-03-11 | End: 2023-03-13 | Stop reason: HOSPADM

## 2023-03-11 RX ORDER — METHYLERGONOVINE MALEATE 0.2 MG/ML
200 INJECTION INTRAVENOUS PRN
Status: DISCONTINUED | OUTPATIENT
Start: 2023-03-11 | End: 2023-03-13 | Stop reason: HOSPADM

## 2023-03-11 RX ORDER — OXYCODONE HYDROCHLORIDE 5 MG/1
10 TABLET ORAL EVERY 4 HOURS PRN
Status: DISCONTINUED | OUTPATIENT
Start: 2023-03-11 | End: 2023-03-13 | Stop reason: HOSPADM

## 2023-03-11 RX ORDER — SODIUM CHLORIDE 0.9 % (FLUSH) 0.9 %
5-40 SYRINGE (ML) INJECTION EVERY 12 HOURS SCHEDULED
Status: DISCONTINUED | OUTPATIENT
Start: 2023-03-11 | End: 2023-03-13 | Stop reason: HOSPADM

## 2023-03-11 RX ORDER — DEXTROSE, SODIUM CHLORIDE, SODIUM LACTATE, POTASSIUM CHLORIDE, AND CALCIUM CHLORIDE 5; .6; .31; .03; .02 G/100ML; G/100ML; G/100ML; G/100ML; G/100ML
INJECTION, SOLUTION INTRAVENOUS CONTINUOUS
Status: DISCONTINUED | OUTPATIENT
Start: 2023-03-11 | End: 2023-03-13 | Stop reason: HOSPADM

## 2023-03-11 RX ADMIN — ACETAMINOPHEN 650 MG: 325 TABLET, FILM COATED ORAL at 13:07

## 2023-03-11 RX ADMIN — ROPIVACAINE HYDROCHLORIDE 8 ML: 2 INJECTION, SOLUTION EPIDURAL; INFILTRATION; PERINEURAL at 13:48

## 2023-03-11 RX ADMIN — Medication 50 MCG: at 00:00

## 2023-03-11 RX ADMIN — Medication 10 MG: at 16:06

## 2023-03-11 RX ADMIN — Medication 1 MILLI-UNITS/MIN: at 08:09

## 2023-03-11 RX ADMIN — BUTORPHANOL TARTRATE 1 MG: 2 INJECTION, SOLUTION INTRAMUSCULAR; INTRAVENOUS at 12:09

## 2023-03-11 RX ADMIN — DOCUSATE SODIUM 100 MG: 100 CAPSULE ORAL at 21:09

## 2023-03-11 RX ADMIN — SODIUM CHLORIDE, PRESERVATIVE FREE 5 ML: 5 INJECTION INTRAVENOUS at 07:00

## 2023-03-11 RX ADMIN — Medication 25 MG: at 14:51

## 2023-03-11 RX ADMIN — Medication 5 MG: at 14:22

## 2023-03-11 RX ADMIN — ROPIVACAINE HYDROCHLORIDE 10 ML/HR: 2 INJECTION, SOLUTION EPIDURAL; INFILTRATION at 13:52

## 2023-03-11 RX ADMIN — SODIUM CHLORIDE, SODIUM LACTATE, POTASSIUM CHLORIDE, CALCIUM CHLORIDE AND DEXTROSE MONOHYDRATE: 5; 600; 310; 30; 20 INJECTION, SOLUTION INTRAVENOUS at 13:23

## 2023-03-11 RX ADMIN — ACETAMINOPHEN 1000 MG: 500 TABLET, FILM COATED ORAL at 21:11

## 2023-03-11 RX ADMIN — Medication 50 MCG: at 04:00

## 2023-03-11 RX ADMIN — Medication 166.7 ML: at 17:05

## 2023-03-11 RX ADMIN — Medication 15 MG: at 14:16

## 2023-03-11 RX ADMIN — VANCOMYCIN HYDROCHLORIDE 1000 MG: 1 INJECTION, POWDER, LYOPHILIZED, FOR SOLUTION INTRAVENOUS at 07:10

## 2023-03-11 ASSESSMENT — PAIN SCALES - GENERAL
PAINLEVEL_OUTOF10: 8
PAINLEVEL_OUTOF10: 6
PAINLEVEL_OUTOF10: 6

## 2023-03-11 ASSESSMENT — PAIN DESCRIPTION - DESCRIPTORS: DESCRIPTORS: ACHING;SORE

## 2023-03-11 ASSESSMENT — PAIN DESCRIPTION - ORIENTATION: ORIENTATION: POSTERIOR;LOWER

## 2023-03-11 ASSESSMENT — PAIN DESCRIPTION - LOCATION: LOCATION: BACK

## 2023-03-11 NOTE — H&P
History & Physical    Name: Huyen Brown MRN: [id number]  SSN: [id number]    YOB: 1990  Age: 34 y.o. Sex: female      Subjective:     Estimated Date of Delivery: 3/16/23  OB History    Para Term  AB Living   4 2 2   1 2   SAB IAB Ectopic Molar Multiple Live Births   1         2      # Outcome Date GA Lbr Ousmane/2nd Weight Sex Delivery Anes PTL Lv   4 Current            3 SAB 2016           2 Term 04/30/15 38w0d  6 lb 10 oz (3.005 kg) M Vag-Spont   ZONIA   1 Term 09 38w0d  8 lb 6 oz (3.799 kg)  Vag-Spont   ZONIA       Ms. Galen Lucio is admitted with pregnancy at 39w2d for induction of labor due to gestational diabetes and morbid obesity. Prenatal course  was complicated by diabetes - gestational diet controlled and recent US showd baby to be ~ 50%tile . Please see prenatal records for details. Past Medical History:   Diagnosis Date    Abnormal Papanicolaou smear of cervix     Anemia     Diabetes mellitus ([address]     Dysthymia 2/3/2022     Past Surgical History:   Procedure Laterality Date    STOMACH SURGERY      gastric sleeve     Social History     Occupational History    Not on file   Tobacco Use    Smoking status: Never    Smokeless tobacco: Never   Vaping Use    Vaping Use: Never used   Substance and Sexual Activity    Alcohol use: No    Drug use: Not Currently    Sexual activity: Yes     Partners: Male     Birth control/protection: None     Family History   Problem Relation Age of Onset    Breast Cancer Neg Hx     Colon Cancer Neg Hx     Ovarian Cancer Neg Hx     Uterine Cancer Neg Hx      Allergies   Allergen Reactions    Penicillins Anaphylaxis     Prior to Admission medications    Medication Sig Start Date End Date Taking?  Authorizing Provider   Ascorbic Acid (VITAMIN C PO) Take by mouth    Historical Provider, MD   metFORMIN (GLUCOPHAGE) 500 MG tablet Take 1 tablet by mouth 2 times daily (with meals) 23   JENNIFER Conteh - CNP   blood glucose monitor strips 1 strip by Other route 4 times daily Use four times daily and as needed to check blood sugar 1/20/23   JENNIFER Jiang CNP   Blood Glucose Monitoring Suppl ([address]) w/Device KIT Use as directed to check blood sugar four times a day 1/19/23   JENNIFER Jiang CNP   ferrous sulfate (IRON 325) 325 (65 Fe) MG tablet Take 1 tablet by mouth 2 times daily 12/29/22   Stephanie Frances MD   Prenatal Vit-Fe Fumarate-FA (PRENATAL PO) Take by mouth    Historical Provider, MD        Review of Systems: Pertinent items are noted in HPI. Objective:     Vitals:  Vitals:    03/11/23 1005 03/11/23 1020 03/11/23 1035 03/11/23 1124   BP: 130/85 126/78 120/65 138/81   Pulse: 80 82 88 67   Resp:       Temp:    98.4 °F (36.9 °C)   TempSrc:    Oral   SpO2:       Weight:       Height:            Physical Exam: Heart: Regular rate and rhythm, Lungs: clear to auscultation without wheezes or rales, Abdomen: soft or nontender    2 cm dilated    60% effaced    -3 station    Presenting Part: cephalic  Cervical Position: posterior  Consistency: Medium  Membranes:Spontaneous Rupture of Membranes; Amniotic Fluid: clear fluid  Fetal Heart Rate: Reactive          Prenatal Labs: GBS: No results found for: GRBSEXT  Blood Type:   Lab Results   Component Value Date/Time    ABORH B POSITIVE 03/10/2023 06:09 PM          Impression/Plan:     Hospital Problems             Last Modified POA    * (Principal) Gestational diabetes mellitus (GDM) in third trimester controlled on oral hypoglycemic drug 3/11/2023 Yes    Overview Addendum 3/9/2023  8:38 AM by JENNIFER Jiang CNP     Failed 1 hr glucola, 3 hr pending    1/19/2023: pt did not bring BS logs, states rx was not covered. New rx sent and pt advised to call office ASAP if not covered  1/23/23: no show for diabetes education  2/2/2023: Fastings , 1hr 114-181. Starting Metformin 500 BID.  Start weekly OBV with US  2/9/2023: did not bring BS logs, reports fastings 80-90s and 1 hr PP 100s-110s  2023: fastings 85-92, 1 hr pp , mostly all within target. Continue Metformin 500   2023: did nto bring BS logs, pt reports fastings 80-90s, 1hr PP 90s-100  3/2/23: Fastings 90-90, 1hr ML26-261, continue Metformin 500 mg BID  3/9/2023: no logs today         Septate uterus affecting pregnancy in third trimester 3/11/2023 Yes    Overview Addendum 2023  8:33 AM by JENNIFER Cevallos CNP     2022: US today ? septate vs bicornuate uterus. . Pt denies previous dx of this or  any complications/PTL in previous pregnancies. Will recheck CL and reassess at 16 weeks    22: CL 5 cm  10/12/2022: CL 3.9 cm  10/27/22:  CL 4.35 cm, no funneling  22:  CL 4.7 cm, no funneling  23:  EFW 47%, AC 43%, SHA 22.0 cm, vtx  2023: EFW 58%, AC 37%, SHA nl, BPP , vertex         Cystic fibrosis carrier 3/11/2023 Yes    Overview Addendum 2022  9:26 AM by Emmanuelle Canales MD     22:  ACOG info prev given to pt, D/W her FOB testing and/or genetic counseling -- pt declines         Cervical high risk HPV (human papillomavirus) test positive 3/11/2023 Yes    Overview Addendum 2022  9:15 AM by Emmanuelle Canales MD     On pap done 2020 in [place] and again 2021:  [facility] in third trimester 3/11/2023 Yes    Overview Addendum 2023 10:26 AM by JENNIFER Cevallos CNP     EDC by 8 5/7 week US (not c/w LMP)    2022: NIPT no result, low fetal fraction. SMA, DMD, and Fragile X carrier screening negative  2022:  NIPT negx3, female  22:  AFP only neg  2023: tdap given         Uterine size date discrepancy pregnancy, third trimester 3/11/2023 Yes    Overview Addendum 3/2/2023  8:33 AM by JENNIFER Cevallos - CNP     FH 35cm @ 28 weeks, US next visit    23:  EFW 47%, AC 43%, SHA 22.0 cm, vtx  2023: EFW 58%, AC 37%, SHA nl, BPP 8/8, vertex  3/2/2023: EFW 50%, AC 49%, SHA nl, BPP 8/8, vertex         Anemia affecting pregnancy in third trimester 3/11/2023 Yes    Overview Addendum 2/17/2023  7:32 AM by JENNIFER Salcedo - CNP     Add'l Iron    2/16/23: Hgb 10.5              Plan: Admit for induction of labor. Group B Strep positive, will treat prophylactically with vancomycin; patient has severe PCN allergy per hx, culture is clinda resistant.

## 2023-03-11 NOTE — ANESTHESIA PRE PROCEDURE
Department of Anesthesiology  Preprocedure Note       Name:  Praful Moyer   Age:  34 y.o.  :  1990                                          MRN:  [id number]         Date:  3/11/2023      Surgeon: * No surgeons listed *    Procedure: * No procedures listed *    Medications prior to admission:   Prior to Admission medications    Medication Sig Start Date End Date Taking?  Authorizing Provider   Ascorbic Acid (VITAMIN C PO) Take by mouth    Historical Provider, MD   metFORMIN (GLUCOPHAGE) 500 MG tablet Take 1 tablet by mouth 2 times daily (with meals) 23   KerriJENNIFER Vaughn - CNP   blood glucose monitor strips 1 strip by Other route 4 times daily Use four times daily and as needed to check blood sugar 23   Kerri Anon, APRN - CNP   Blood Glucose Monitoring Suppl ([address]) w/Device KIT Use as directed to check blood sugar four times a day 23   Kerrirosy Neff APRN - CNP   ferrous sulfate (IRON 325) 325 (65 Fe) MG tablet Take 1 tablet by mouth 2 times daily 22   Samy Maurie Simmonds, MD   Prenatal Vit-Fe Fumarate-FA (PRENATAL PO) Take by mouth    Historical Provider, MD       Current medications:    Current Facility-Administered Medications   Medication Dose Route Frequency Provider Last Rate Last Admin    dextrose 5 % in lactated ringers infusion   IntraVENous Continuous Alfonso Roman  mL/hr at 23 1323 New Bag at 23 1323    lactated ringers bolus  500 mL IntraVENous PRN Alfonso Roman MD        Or    lactated ringers bolus  1,000 mL IntraVENous PRN Alfonso Roman MD        sodium chloride flush 0.9 % injection 5-40 mL  5-40 mL IntraVENous 2 times per day Alfonso Roman MD   5 mL at 23 0700    sodium chloride flush 0.9 % injection 5-40 mL  5-40 mL IntraVENous PRN Alfonso Roman MD        0.9 % sodium chloride infusion  25 mL IntraVENous PRN Alfonso Roman MD        methylergonovine (METHERGINE) injection 200 mcg 200 mcg IntraMUSCular Once PRN Lou Lima MD        carboprost (HEMABATE) injection 250 mcg  250 mcg IntraMUSCular Once PRN Lou Lima MD        tranexamic acid-NaCl IVPB premix 1,000 mg  1,000 mg IntraVENous Once PRN Lou Lima MD        oxytocin (PITOCIN) 30 units in 500 mL infusion  87.3 faiza-units/min IntraVENous Continuous ARMANDON Lou Lima MD        And    oxytocin (PITOCIN) 10 unit bolus from the bag  10 Units IntraVENous PRN Lou Lima MD        terbutaline (BRETHINE) injection 0.25 mg  0.25 mg SubCUTAneous Once PRN Lou Lima MD        butorphanol (STADOL) injection 1 mg  1 mg IntraVENous Q3H PRN Lou Lima MD   1 mg at 03/11/23 1209    famotidine (PEPCID) tablet 20 mg  20 mg Oral BID PRN Lou Lima MD        ondansetron [facility]) injection 4 mg  4 mg IntraVENous Q6H PRN Lou Lima MD        docusate sodium (COLACE) capsule 100 mg  100 mg Oral BID Lou Lima MD        oxytocin (PITOCIN) 30 units in 500 mL infusion  1-20 faiza-units/min IntraVENous Continuous Lou Lima MD 16 mL/hr at 03/11/23 1258 16 faiza-units/min at 03/11/23 1258    vancomycin (VANCOCIN) 1,000 mg in sodium chloride 0.9 % 250 mL IVPB ([id number])  1,000 mg IntraVENous Q12H Jamel Beltrán MD   Stopped at 03/11/23 0849    acetaminophen (TYLENOL) tablet 650 mg  650 mg Oral Q4H PRN Lou Lima MD   650 mg at 03/11/23 1307    zolpidem (AMBIEN) tablet 5 mg  5 mg Oral Nightly PRN Lou Lima MD        calcium carbonate (TUMS) chewable tablet 500 mg  500 mg Oral TID PRN Lou Lima MD           Allergies:     Allergies   Allergen Reactions    Penicillins Anaphylaxis       Problem List:    Patient Active Problem List   Diagnosis Code    Cervical high risk HPV (human papillomavirus) test positive R87.810    Multigravida in third trimester Z34.83    Septate uterus affecting pregnancy in third trimester O34.03, Q51.28    Cystic fibrosis carrier Z14.1    Uterine size date discrepancy pregnancy, third trimester O26.843    Anemia affecting pregnancy in third trimester O99.013    Gestational diabetes mellitus (GDM) in third trimester controlled on oral hypoglycemic drug O24.415       Past Medical History:        Diagnosis Date    Abnormal Papanicolaou smear of cervix     Anemia     Diabetes mellitus [address] [address]     Dysthymia 2/3/2022       Past Surgical History:        Procedure Laterality Date    STOMACH SURGERY  2020    gastric sleeve       Social History:    Social History     Tobacco Use    Smoking status: Never    Smokeless tobacco: Never   Substance Use Topics    Alcohol use: No                                Counseling given: Not Answered      Vital Signs (Current):   Vitals:    03/11/23 1315 03/11/23 1330 03/11/23 1345 03/11/23 1346   BP: (!) 149/76 (!) 146/72 122/64 134/75   Pulse: 68 67 74 63   Resp:       Temp:       TempSrc:       SpO2:       Weight:       Height:                                                  BP Readings from Last 3 Encounters:   03/11/23 134/75   03/09/23 126/82   03/09/23 121/72       NPO Status:                                                                                 BMI:   Wt Readings from Last 3 Encounters:   03/10/23 286 lb (129.7 kg)   03/09/23 286 lb (129.7 kg)   03/02/23 287 lb (130.2 kg)     Body mass index is 44.79 kg/m².     CBC:   Lab Results   Component Value Date/Time    WBC 8.1 03/10/2023 06:09 PM    RBC 4.05 03/10/2023 06:09 PM    HGB 10.9 03/10/2023 06:09 PM    HCT 33.1 03/10/2023 06:09 PM    MCV 81.7 03/10/2023 06:09 PM    RDW 15.3 03/10/2023 06:09 PM     03/10/2023 06:09 PM       CMP: No results found for: NA, K, CL, CO2, BUN, CREATININE, GFRAA, AGRATIO, LABGLOM, GLUCOSE, GLU, PROT, CALCIUM, BILITOT, ALKPHOS, AST, ALT    POC Tests:   Recent Labs     03/11/23  1000   POCGLU 97       Coags: No results found for: PROTIME, INR, APTT    HCG (If Applicable):   Lab Results Component Value Date    PREGTESTUR Positive (A) 2022        ABGs: No results found for: PHART, PO2ART, KNH7JRA, VHT9HSE, BEART, K3UYPBYL     Type & Screen (If Applicable):  No results found for: LABABO, LABRH    Drug/Infectious Status (If Applicable):  Lab Results   Component Value Date/Time    HEPCAB NONREACTIVE 2022 11:07 AM       COVID-19 Screening (If Applicable): No results found for: COVID19        Anesthesia Evaluation  Patient summary reviewed  Airway: Mallampati: I  TM distance: >3 FB   Neck ROM: full  Mouth opening: > = 3 FB   Dental: normal exam         Pulmonary:Negative Pulmonary ROS breath sounds clear to auscultation                             Cardiovascular:Negative CV ROS  Exercise tolerance: good (>4 METS),           Rhythm: regular  Rate: normal                    Neuro/Psych:   Negative Neuro/Psych ROS              GI/Hepatic/Renal:   (+) morbid obesity          Endo/Other:    (+) Diabetes, . ROS comment:  induction of labor  Anemia affecting pregnancy in third trimester  Gestational diabetes mellitus (GDM) in third trimester controlled on oral hypoglycemic drug    Labs acceptable for neuraxial block Abdominal:             Vascular: negative vascular ROS. Other Findings:           Anesthesia Plan      epidural     ASA 3             Anesthetic plan and risks discussed with patient. Plan discussed with CRNA.                     Melynda Carrel, MD   3/11/2023

## 2023-03-11 NOTE — ANESTHESIA PROCEDURE NOTES
Epidural Block    Patient location during procedure: OB  Start time: 3/11/2023 1:32 PM  End time: 3/11/2023 1:45 PM  Reason for block: labor epidural  Staffing  Performed: anesthesiologist   Anesthesiologist: Nancy Bailey MD  Epidural  Patient position: sitting  Prep: ChloraPrep  Patient monitoring: continuous pulse ox and frequent blood pressure checks  Approach: midline  Location: L3-4  Injection technique: ADRIENNE air and ADRIENNE saline  Guidance: Anatomical [place]  Provider prep: mask and sterile gloves  Needle  Needle type: Tuohy   Needle gauge: 17 G  Needle length: 6 in (10 cm)  Needle insertion depth: 9 cm  Catheter type: end hole  Catheter size: 19 G  Catheter at skin depth: 15 cm  Test dose: negative (3 ml test dose of 1.5% lidocaine with 1:200,000 Epinephrine. )Catheter Secured: tegaderm and tape (liquid adhesive)  Assessment  Hemodynamics: stable  Attempts: 2  Outcomes: patient tolerated procedure well  Additional Notes  Prior to procedure, verbal and written informed consent obtained from patient. Benefits and risks of epidural for labor analgesia discussed, including sensory blockade and progression throughout labor, potential for a failed epidural ~ 10% (no effect, unilateral, or initially effective with subsequent ineffectiveness of pain relief), potential for paresthesia during insertion, pain at site of placement, infection, bleeding, nerve damage, potential for  dural puncture headache with placement ~1% if occurred upon placement, and expected hemodynamic changes due to epidural medications that require additional treatment. 3 cc 1% lidocaine local injected at needle insertion site. Initial attempt with 3.5 inch Tuohy, but unable to reach space. Second attempt, Crisp Loss of resistance, catheter threaded easily. Negative aspiration for blood or CSF prior to test dose administration. Patient tolerated procedure well.         Preanesthetic Checklist  Completed: patient identified, IV checked, risks and benefits discussed, surgical/procedural consents, equipment checked, pre-op evaluation, timeout performed, anesthesia consent given, oxygen available and monitors applied/VS acknowledged

## 2023-03-11 NOTE — PROGRESS NOTES
Dr. Sherron Kelsey called and asked to review FHR tracing. Will come to unit. Pitocin to remain at 2 for now.

## 2023-03-11 NOTE — PROGRESS NOTES
PT to hands and knees, then flying cowgirl with peanut ball to attempt to bring fetal head into pelvis. Pt tolerated well.

## 2023-03-11 NOTE — L&D DELIVERY NOTE
Mother's Information      Labor Events     Labor?: No  Cervical Ripening:   Now               Caridad Britta Girl Amando  [[id number]]      Labor Events     Labor?: No   Steroids?: None  Cervical Ripening Date/Time: 3/10/23 19:00:00   Cervical Ripening Type: Misoprostol  Antibiotics Received during Labor?: Yes  Rupture Identifier: Sac 1   Rupture Date/Time: 3/11/23 04:00:00   Rupture Type: SROM  Fluid Color: Clear  Fluid Odor: None  Fluid Volume:  Moderate  Induction: Oxytocin  Augmentation: Oxytocin  Labor Complications: None       Anesthesia    Method: Epidural       Start Pushing      Labor onset date/time: 3/11/23 04:00:00 Now     Dilation complete date/time: 3/11/23 16:30:00 Now     Start pushing date/time: 3/11/2023 16:35:00   Decision date/time (emergent ):           Labor Length    1st stage: 12h 30m  2nd stage: 0h 26m  3rd stage: 0h 09m       Delivery ()      Delivery Date/Time:  3/11/23 16:56:00   Delivery Type: Vaginal, Spontaneous    Details:            [place] Presentation    Presentation: Vertex  Position: Right  _: Occiput  _: Anterior       Shoulder Dystocia    Shoulder Dystocia Present?: No  Add Second Maneuver  Add Third Maneuver  Add Fourth Maneuver  Add Fifth Maneuver  Add Sixth Maneuver  Add Seventh Maneuver  Add Eighth Maneuver  Add Ninth Maneuver       Assisted Delivery Details    Forceps Attempted?: No  Vacuum Extractor Attempted?: No       Document Additional Attempt         Document Additional Attempt                 Cord    Vessels: 3 Vessels  Complications: None  Delayed Cord Clamping?: Yes  Cord Clamped Date/Time: 3/11/2023 16:57:00  Cord Blood Disposition: Lab  Gases Sent?: Yes       Placenta    Date/Time: 3/11/2023 17:05:00  Removal: Spontaneous  Appearance: Intact  Disposition: Discarded       Lacerations    Episiotomy: None  Other Lacerations: labial laceration, vaginal laceration  Labial Laceration: bilateral Repaired?: Yes     Vaginal Laceration?: Yes Repaired?: Yes   Number of Repair Packets: 1       Vaginal Counts    Initial Count Personnel: MARIA TERESA SMART  Initial Count Verified By: PETE WHITTAKER    Sponges Needles Instruments   Initial Counts Correct Correct    Final Counts      Final Count Personnel: MARIA TERESA SMART  Final Count Verified By: PETE WHITTAKER  Accurate Final Count?: Yes  If the count is incorrect due to Intentionally Retained Foreign Object (IRFO) add the IRFO LDA in Lines/Drains.  Add LDA: Link to LDA       Blood Loss  Mother: Amelia Newton N #[id number]     Start of Mother's Information      Delivery Blood Loss  23 0400 - 23 1715      Quantitative Blood Loss (mL) Hospital Encounter 123 grams    Total  123 mL               End of Mother's Information  Mother: Amelia Newton N #[id number]                Delivery Providers    Delivering clinician: Yony Velasco MD     Provider Role    Yony Velasco MD Obstetrician    Genet Whittaker, RN Primary Nurse    Gracie Ndiaye, RN Primary [place] Nurse    Isabel Smart Scrub Tech    Ashleigh Almodovar RN Charge Nurse    Tiera Salvador RN Staff Nurse               Assessment    Living Status: Living  Delivery Location Comment: [address]     Apgar Scoring Key:    0 1 2    Skin Color: Blue or pale Acrocyanotic Completely pink    Heart Rate: Absent <100 bpm >100 bpm    Reflex Irritability: No response Grimace Cry or active withdrawal    Muscle Tone: Limp Some flexion Active motion    Respiratory Effort: Absent Weak cry; hypoventilation Good, crying                      Skin Color:   Heart Rate:   Reflex Irritability:   Muscle Tone:   Respiratory Effort:   Total:            1 Minute:    1    2    2    2    2    9        Apgar 1 total from OB History    5 Minute:    1    2    2    2    2    9        Apgar 5 total from OB History    10 Minute:              15 Minute:              20 Minute:                        Apgars Assigned By: ASHLEIGH ALMODOVAR RN               Resuscitation    Method: Bulb Suction, Room Air, Stimulation             Murphy Measurements      Birth Weight: 3260 g Birth Length: 0.533 m     Head Circumference: 0.33 m Chest Circumference: 0.32 m              Title      Skin to Skin Initiation Date/Time: 3/11/23 16:56:00 EST     Skin to Skin With: Mother     Skin to Skin End Date/Time: 3/11/23 17:05:00                  vigorous cry at delivery.   Present and scrubbed for the entire procedure

## 2023-03-11 NOTE — PROGRESS NOTES
03/10/23 1950   Provider Notification   Reason for Communication Other (Comment)  (Plan of Care)   Team Member Name Dr. Skye Hayes Attending Provider   Method of Communication Call   Response No new orders   Notification Time 1950

## 2023-03-11 NOTE — PROGRESS NOTES
At this time (1315), patient has decided that she would like the epidural. Dr. Sravan Duff and Sami Denton notified of patient's decision.

## 2023-03-11 NOTE — ANESTHESIA POSTPROCEDURE EVALUATION
Department of Anesthesiology  Postprocedure Note    Patient: Marcelo Hi  MRN: [id number]  YOB: 1990  Date of evaluation: 3/11/2023      Procedure Summary     Date: 03/11/23 Room / Location:     Anesthesia Start: 1329 Anesthesia Stop: 1656    Procedure: Labor Analgesia Diagnosis:     Scheduled Providers:  Responsible Provider: Iva Melo MD    Anesthesia Type: epidural ASA Status: 3          Anesthesia Type: No value filed.     Anny Phase I:      Anny Phase II:        Anesthesia Post Evaluation    Patient participation: complete - patient participated  Level of consciousness: awake and alert  Airway patency: patent  Nausea & Vomiting: no nausea and no vomiting  Complications: no  Cardiovascular status: hemodynamically stable  Respiratory status: acceptable, nonlabored ventilation and spontaneous ventilation  Hydration status: euvolemic  Comments: /60   Pulse 98   Temp 97.3 °F (36.3 °C) (Oral)   Resp 14   Ht 5' 7\" (1.702 m)   Wt 286 lb (129.7 kg)   LMP 05/20/2022   SpO2 96%   Breastfeeding Unknown   BMI 44.79 kg/m²     Multimodal analgesia pain management approach

## 2023-03-11 NOTE — PROGRESS NOTES
Strip reviewed. , has episodes of +STV & +accels but also episodes of deep variable decels. 8cm per RN    BP low, has rec'd #3 L of LR and 3 doses of ephedrine. BP while I was at bedside was ~100/50 about 30 min ago. RN reports only 250cc on recent I&O cath. Rec push IVF and repeat ephedrine dose, pit cautiously.

## 2023-03-12 PROCEDURE — 1100000000 HC RM PRIVATE

## 2023-03-12 PROCEDURE — 6370000000 HC RX 637 (ALT 250 FOR IP): Performed by: OBSTETRICS & GYNECOLOGY

## 2023-03-12 RX ADMIN — WITCH HAZEL: 500 SOLUTION RECTAL; TOPICAL at 09:12

## 2023-03-12 RX ADMIN — ACETAMINOPHEN 1000 MG: 500 TABLET, FILM COATED ORAL at 04:38

## 2023-03-12 RX ADMIN — ACETAMINOPHEN 1000 MG: 500 TABLET, FILM COATED ORAL at 18:29

## 2023-03-12 RX ADMIN — IBUPROFEN 600 MG: 600 TABLET, FILM COATED ORAL at 15:47

## 2023-03-12 RX ADMIN — ACETAMINOPHEN 1000 MG: 500 TABLET, FILM COATED ORAL at 12:12

## 2023-03-12 RX ADMIN — DOCUSATE SODIUM 100 MG: 100 CAPSULE ORAL at 21:29

## 2023-03-12 RX ADMIN — DOCUSATE SODIUM 100 MG: 100 CAPSULE ORAL at 09:13

## 2023-03-12 RX ADMIN — IBUPROFEN 600 MG: 600 TABLET, FILM COATED ORAL at 09:13

## 2023-03-12 RX ADMIN — Medication: at 09:11

## 2023-03-12 RX ADMIN — IBUPROFEN 600 MG: 600 TABLET, FILM COATED ORAL at 01:33

## 2023-03-12 RX ADMIN — IBUPROFEN 600 MG: 600 TABLET, FILM COATED ORAL at 21:29

## 2023-03-12 ASSESSMENT — PAIN SCALES - GENERAL
PAINLEVEL_OUTOF10: 2
PAINLEVEL_OUTOF10: 4
PAINLEVEL_OUTOF10: 2
PAINLEVEL_OUTOF10: 4
PAINLEVEL_OUTOF10: 4
PAINLEVEL_OUTOF10: 2
PAINLEVEL_OUTOF10: 2
PAINLEVEL_OUTOF10: 3
PAINLEVEL_OUTOF10: 4
PAINLEVEL_OUTOF10: 2
PAINLEVEL_OUTOF10: 5

## 2023-03-12 ASSESSMENT — PAIN DESCRIPTION - ORIENTATION
ORIENTATION: ANTERIOR;LOWER
ORIENTATION: ANTERIOR;LOWER
ORIENTATION: LOWER;DISTAL
ORIENTATION: ANTERIOR;LOWER
ORIENTATION: LOWER;ANTERIOR
ORIENTATION: ANTERIOR;LOWER
ORIENTATION: ANTERIOR;LOWER

## 2023-03-12 ASSESSMENT — PAIN DESCRIPTION - DESCRIPTORS
DESCRIPTORS: CRAMPING
DESCRIPTORS: ACHING
DESCRIPTORS: CRAMPING

## 2023-03-12 ASSESSMENT — PAIN - FUNCTIONAL ASSESSMENT
PAIN_FUNCTIONAL_ASSESSMENT: ACTIVITIES ARE NOT PREVENTED

## 2023-03-12 ASSESSMENT — PAIN DESCRIPTION - LOCATION
LOCATION: ABDOMEN
LOCATION: BACK
LOCATION: ABDOMEN
LOCATION: ABDOMEN

## 2023-03-12 NOTE — LACTATION NOTE

## 2023-03-12 NOTE — PROGRESS NOTES
Progress Note                               Patient: Inell Canavan MRN: [id number]  SSN: [id number]    YOB: 1990  Age: 34 y.o. Sex: female      Postpartum Day Number 1    Subjective:     Patient doing well without significant complaints. Ambulating, voiding without difficulty Patient reports normal lochia. . Infant: Breastfeeding and/or pumping    Objective:     Patient Vitals for the past 18 hrs:   Temp Pulse Resp BP SpO2   23 0625 98.4 °F (36.9 °C) 76 17 112/61 97 %   23 0448 -- -- -- 118/75 --   23 0313 97.9 °F (36.6 °C) 77 18 139/73 97 %   23 2316 98.6 °F (37 °C) 94 16 120/66 97 %   23 2111 97.9 °F (36.6 °C) 88 16 117/61 --   23 2005 98.2 °F (36.8 °C) 87 17 (!) 115/59 --   23 1800 -- 98 -- 123/60 --        Temp (24hrs), Av °F (36.7 °C), Min:97.3 °F (36.3 °C), Max:98.6 °F (37 °C)      Physical Exam:    Patient without distress. Neuro / Psych grossly WNL, A&O X 3    Lab/Data Review: All lab results for the last 24 hours reviewed. Assessment and Plan:     Patient appears to be having an uncomplicated postpartum course. Continue routine perineal care and maternal education.     Jeanmarie Mena MD

## 2023-03-12 NOTE — LACTATION NOTE
This note was copied from a baby's chart. In to see mom and infant for first time. Mom's 3rd baby. She breast fed her other 2 children for 1 yr each. Reviewed 1st 24 hr feeding/output expectations and admission info. Mom feels overall baby is latching and feeding well and no concerns. Declined offer for breastfeeding assistance today or observation. Answered mom's questions and reviewed normalcy of periods of cluster feeding. No further needs at this time.  Lactation to follow up in am.

## 2023-03-12 NOTE — LACTATION NOTE
This note was copied from a baby's chart. Motif Rhiannon:  Cycle is the number of times the pump pulls per minute. Fast cycling stimulates let-down, slow cycling expresses available milk. Level is how strong the pump is pulling. The Motif Kira Meehan will restart on whatever cycle it was on when you shut it off. Power on and press wavy line button if not already on massage cycle setting. Initial cycle should be ~70 (choose from 60-80 pulls per minute based on your preference). Adjust level to comfort. Push the level up until it is a little uncomfortable and then back down until comfortable. Pain does not equal milk. After 2 minutes, press wavy line button again to go into expression mode. Cycle should be set to 42-46. Again, adjust level to comfort. Cycle indicates the number of times per minute the pump is pulling. Majority of time should be in slower Expression Mode. There are multiple settings that can be utilized with this pump. These are the standard instructions.   [facility] [phone]

## 2023-03-13 VITALS
OXYGEN SATURATION: 99 % | TEMPERATURE: 98.5 F | WEIGHT: 286 LBS | HEART RATE: 82 BPM | RESPIRATION RATE: 16 BRPM | SYSTOLIC BLOOD PRESSURE: 117 MMHG | BODY MASS INDEX: 44.89 KG/M2 | DIASTOLIC BLOOD PRESSURE: 76 MMHG | HEIGHT: 67 IN

## 2023-03-13 PROCEDURE — 6370000000 HC RX 637 (ALT 250 FOR IP): Performed by: OBSTETRICS & GYNECOLOGY

## 2023-03-13 RX ORDER — IBUPROFEN 600 MG/1
600 TABLET ORAL EVERY 6 HOURS PRN
Qty: 90 TABLET | Refills: 0 | Status: SHIPPED | OUTPATIENT
Start: 2023-03-13

## 2023-03-13 RX ORDER — FUROSEMIDE 20 MG/1
20 TABLET ORAL ONCE
Status: COMPLETED | OUTPATIENT
Start: 2023-03-13 | End: 2023-03-13

## 2023-03-13 RX ADMIN — FUROSEMIDE 20 MG: 20 TABLET ORAL at 11:56

## 2023-03-13 RX ADMIN — ACETAMINOPHEN 1000 MG: 500 TABLET, FILM COATED ORAL at 05:41

## 2023-03-13 RX ADMIN — DOCUSATE SODIUM 100 MG: 100 CAPSULE ORAL at 09:03

## 2023-03-13 RX ADMIN — IBUPROFEN 600 MG: 600 TABLET, FILM COATED ORAL at 09:02

## 2023-03-13 RX ADMIN — ACETAMINOPHEN 1000 MG: 500 TABLET, FILM COATED ORAL at 00:29

## 2023-03-13 RX ADMIN — IBUPROFEN 600 MG: 600 TABLET, FILM COATED ORAL at 03:04

## 2023-03-13 ASSESSMENT — PAIN DESCRIPTION - DESCRIPTORS: DESCRIPTORS: ACHING;CRAMPING;SORE

## 2023-03-13 ASSESSMENT — PAIN SCALES - GENERAL
PAINLEVEL_OUTOF10: 2

## 2023-03-13 ASSESSMENT — PAIN DESCRIPTION - ORIENTATION: ORIENTATION: ANTERIOR;LOWER

## 2023-03-13 ASSESSMENT — PAIN - FUNCTIONAL ASSESSMENT: PAIN_FUNCTIONAL_ASSESSMENT: ACTIVITIES ARE NOT PREVENTED

## 2023-03-13 ASSESSMENT — PAIN DESCRIPTION - LOCATION: LOCATION: ABDOMEN;PERINEUM

## 2023-03-13 NOTE — DISCHARGE INSTRUCTIONS
Pelvic rest for 6wk  NO driving for 2wk or while taking pain medications  NO tub baths, pools, or hot tubs for 6wk. NO push/pull motion such as vacuuming or sweeping for 2wk  NO lifting >10lb for 2wk. Vaginal Childbirth: Care Instructions  Overview     Vaginal birth means delivering a baby through the birth canal (vagina). During labor, the uterus tightens (contracts) regularly to thin and open the cervix and to push the baby out through the birth canal.  Your body will slowly heal in the next few weeks. It's easy to get too tired and overwhelmed during the first weeks after your baby is born. Changes in your hormones can shift your mood without warning. You may find it hard to meet the extra demands on your energy and time. Take it easy on yourself. Follow-up care is a key part of your treatment and safety. Be sure to make and go to all appointments, and call your doctor if you are having problems. It's also a good idea to know your test results and keep a list of the medicines you take. How can you care for yourself at home? Vaginal bleeding and cramps  After delivery, you will have a bloody discharge from your vagina. This will turn pink within a week and then white or yellow after about 10 days. It may last for 2 to 4 weeks or longer, until the uterus has healed. Use sanitary pads until you stop bleeding. Using pads makes it easier to monitor your bleeding. Don't worry if you pass some blood clots, as long as they are smaller than a golf ball. If you have a tear or stitches in your vaginal area, change the pad at least every 4 hours. This will help prevent soreness and infection. You may have cramps for the first few days after childbirth. These are normal and occur as the uterus shrinks to normal size. Take an over-the-counter pain medicine, such as acetaminophen (Tylenol), ibuprofen (Advil, Motrin), or naproxen (Aleve), for cramps. Read and follow all instructions on the label.  Do not take aspirin, because it can cause more bleeding. Do not take two or more pain medicines at the same time unless the doctor told you to. Many pain medicines have acetaminophen, which is Tylenol. Too much acetaminophen (Tylenol) can be harmful. Stitches  If you have stitches, they will dissolve on their own and don't need to be removed. Follow your doctor's instructions for cleaning the stitched area. Put ice or a cold pack on your painful area for 10 to 20 minutes at a time, several times a day, for the first few days. Put a thin cloth between the ice and your skin. Sit in a few inches of warm water (sitz bath) 3 times a day and after bowel movements. The warm water helps with pain and itching. If you don't have a tub, a warm shower might help. Breast fullness  Your breasts may overfill (engorge) in the first few days after delivery. To help milk flow and to relieve pain, warm your breasts in the shower or by using warm, moist towels before nursing. If you aren't nursing, don't put warmth on your breasts or touch your breasts. Wear a bra that fits well and use ice until the fullness goes away. This usually takes 2 to 3 days. Put ice or a cold pack on your breast after nursing to reduce swelling and pain. Put a thin cloth between the ice and your skin. Activity  Eat a balanced diet. Don't try to lose weight by cutting calories. Keep taking your prenatal vitamins, or take a multivitamin. Get as much rest as you can. Try to take naps when your baby sleeps during the day. Get some exercise every day. But don't do any heavy exercise until your doctor says it is okay. Wait until you are healed (about 4 to 6 weeks) before you have sexual intercourse. Your doctor will tell you when it is okay to have sex. If you don't want to get pregnant, talk to your doctor about birth control. You can get pregnant even before your period returns. Also, you can get pregnant while you are breastfeeding.   Mental health  It's normal to have some sadness, anxiety, sleeplessness, and mood swings after you go home. If you feel upset or hopeless for more than a few days or are having trouble doing the things you need to do, talk to your doctor. Constipation and hemorrhoids  Drink plenty of fluids. If you have kidney, heart, or liver disease and have to limit fluids, talk with your doctor before you increase the amount of fluids you drink. Eat plenty of fiber each day. Have a bran muffin or bran cereal for breakfast. Try eating a piece of fruit for a mid-afternoon snack. For painful, itchy hemorrhoids, put ice or a cold pack on the area several times a day for 10 minutes at a time. Follow this by putting a warm compress on the area for another 10 to 20 minutes or by sitting in a shallow, warm bath. When should you call for help? Share this information with your partner, family, or a friend. They can help you watch for warning signs. Call 911  anytime you think you may need emergency care. For example, call if:    You have thoughts of harming yourself, your baby, or another person. You passed out (lost consciousness). You have chest pain, are short of breath, or cough up blood. You have a seizure. Call your doctor now or seek immediate medical care if:    You have signs of hemorrhage (too much bleeding), such as:  Heavy vaginal bleeding. This means that you are soaking through one or more pads in an hour. Or you pass blood clots bigger than an egg. Feeling dizzy or lightheaded, or you feel like you may faint. Feeling so tired or weak that you cannot do your usual activities. A fast or irregular heartbeat. New or worse belly pain. You have signs of infection, such as:  A fever. Vaginal discharge that smells bad. New or worse belly pain. You have symptoms of a blood clot in your leg (called a deep vein thrombosis), such as:  Pain in the calf, back of the knee, thigh, or groin. Redness and swelling in your leg or groin. You have signs of preeclampsia, such as:  Sudden swelling of your face, hands, or feet. New vision problems (such as dimness, blurring, or seeing spots). A severe headache. Watch closely for changes in your health, and be sure to contact your doctor if:    Your vaginal bleeding isn't decreasing. You feel sad, anxious, or hopeless for more than a few days. You are having problems with your breasts or breastfeeding. Where can you learn more? Go to http://www.woods.com/ and enter Q237 to learn more about \"Vaginal Childbirth: Care Instructions. \"  Current as of: February 23, 2022               Content Version: 13.5  © 3134-3846 [employer]. Care instructions adapted under license by [facility] [address] If you have questions about a medical condition or this instruction, always ask your healthcare professional. [address] any warranty or liability for your use of this information.

## 2023-03-13 NOTE — PLAN OF CARE
Problem: Safety - Adult  Goal: Free from fall injury  3/13/2023 0935 by Adiel Leger RN  Outcome: Completed  3/12/2023 2149 by Jose Tubbs RN  Outcome: Progressing     Problem: ABCDS Injury Assessment  Goal: Absence of physical injury  3/13/2023 0935 by Adiel Leger RN  Outcome: Completed  3/12/2023 2149 by Jose Tubbs RN  Outcome: Progressing     Problem: Postpartum  Goal: Experiences normal postpartum course  Description:  Postpartum OB-Pregnancy care plan goal which identifies if the mother is experiencing a normal postpartum course  3/13/2023 0935 by Adiel Leger RN  Outcome: Completed  3/12/2023 2149 by Jose Tubbs RN  Outcome: Progressing  Goal: Appropriate maternal -  bonding  Description:  Postpartum OB-Pregnancy care plan goal which identifies if the mother and  are bonding appropriately  3/13/2023 0935 by Adiel Leger RN  Outcome: Completed  3/12/2023 2149 by Jose Tubbs RN  Outcome: Progressing  Goal: Establishment of infant feeding pattern  Description:  Postpartum OB-Pregnancy care plan goal which identifies if the mother is establishing a feeding pattern with their   3/13/2023 0935 by Adiel Leger RN  Outcome: Completed  3/12/2023 2149 by Jose Tubbs RN  Outcome: Progressing  Goal: Incisions, wounds, or drain sites healing without S/S of infection  3/13/2023 0935 by Adiel Leger RN  Outcome: Completed  3/12/2023 2149 by Jose Tubbs RN  Outcome: Progressing     Problem: Pain  Goal: Verbalizes/displays adequate comfort level or baseline comfort level  3/13/2023 0935 by Adiel Leger RN  Outcome: Completed  3/12/2023 2149 by Jose Tubbs RN  Outcome: Progressing  Flowsheets (Taken 3/12/2023 2129)  Verbalizes/displays adequate comfort level or baseline comfort level:   Encourage patient to monitor pain and request assistance   Assess pain using appropriate pain scale   Administer analgesics based on type and severity of pain and evaluate response   Notify Licensed Independent Practitioner if interventions unsuccessful or patient reports new pain     Problem: Infection - Adult  Goal: Absence of infection at discharge  3/12/2023 2149 by Jenifer Felty, RN  Outcome: Progressing  Goal: Absence of infection during hospitalization  3/12/2023 2149 by Jenifer Felty, RN  Outcome: Progressing  Goal: Absence of fever/infection during anticipated neutropenic period  3/12/2023 2149 by Jenifer Felty, RN  Outcome: Progressing     Problem: Discharge Planning  Goal: Discharge to home or other facility with appropriate resources  3/13/2023 0935 by Santos Arvizu RN  Outcome: Completed  3/12/2023 2149 by Jenifer Felty, RN  Outcome: Progressing     Problem: Chronic Conditions and Co-morbidities  Goal: Patient's chronic conditions and co-morbidity symptoms are monitored and maintained or improved  3/13/2023 0935 by Santos Arvizu RN  Outcome: Completed  3/12/2023 2149 by Jenifer Felty, RN  Outcome: Progressing

## 2023-03-13 NOTE — DISCHARGE SUMMARY
Discharge Summary:     Date of Admission:  3/10/2023  5:45 PM  Date of Discharge:   3/13/2023      Patient is a 34 y.o. [id number] at 43w4d wks who was admitted for IOL due to A1DM, morbid obesity. She had a  and a normal PP course. Patient remained afebrile throughout the entire hospital stay. On day of discharge, she was discharged home in good condition, meeting all postop goals with routine postpartum instructions. Pt to follow up in 6  weeks for pp visit at [facility]. HTN diagnosis:  no     Discharge Meds:       Medication List        START taking these medications      ibuprofen 600 MG tablet  Commonly known as: ADVIL;MOTRIN  Take 1 tablet by mouth every 6 hours as needed for Pain            CONTINUE taking these medications      OneTouch Verio Flex System w/Device Kit  Use as directed to check blood sugar four times a day     PRENATAL PO     VITAMIN C PO            STOP taking these medications      blood glucose test strips     ferrous sulfate 325 (65 Fe) MG tablet  Commonly known as: IRON 325     metFORMIN 500 MG tablet  Commonly known as: GLUCOPHAGE               Where to Get Your Medications        These medications were sent to [address] [place], [place] [address] [phone]  [address] [place] [address]      Phone: [phone]   ibuprofen 600 MG tablet         No problem-specific Assessment & Plan notes found for this encounter.        Sp Armenta MD  12:27 PM  23

## 2023-03-13 NOTE — PLAN OF CARE
Problem: Safety - Adult  Goal: Free from fall injury  Outcome: Progressing     Problem: ABCDS Injury Assessment  Goal: Absence of physical injury  Outcome: Progressing     Problem: Postpartum  Goal: Experiences normal postpartum course  Description:  Postpartum OB-Pregnancy care plan goal which identifies if the mother is experiencing a normal postpartum course  Outcome: Progressing  Goal: Appropriate maternal -  bonding  Description:  Postpartum OB-Pregnancy care plan goal which identifies if the mother and  are bonding appropriately  Outcome: Progressing  Goal: Establishment of infant feeding pattern  Description:  Postpartum OB-Pregnancy care plan goal which identifies if the mother is establishing a feeding pattern with their   Outcome: Progressing  Goal: Incisions, wounds, or drain sites healing without S/S of infection  Outcome: Progressing     Problem: Pain  Goal: Verbalizes/displays adequate comfort level or baseline comfort level  Outcome: Progressing  Flowsheets (Taken 3/12/2023 2129)  Verbalizes/displays adequate comfort level or baseline comfort level:   Encourage patient to monitor pain and request assistance   Assess pain using appropriate pain scale   Administer analgesics based on type and severity of pain and evaluate response   Notify Licensed Independent Practitioner if interventions unsuccessful or patient reports new pain     Problem: Infection - Adult  Goal: Absence of infection at discharge  Outcome: Progressing  Goal: Absence of infection during hospitalization  Outcome: Progressing  Goal: Absence of fever/infection during anticipated neutropenic period  Outcome: Progressing     Problem: Discharge Planning  Goal: Discharge to home or other facility with appropriate resources  Outcome: Progressing     Problem: Chronic Conditions and Co-morbidities  Goal: Patient's chronic conditions and co-morbidity symptoms are monitored and maintained or improved  Outcome: Progressing

## 2023-03-13 NOTE — CARE COORDINATION
Chart reviewed - dysthymia. SW met with patient to complete initial assessment. Patient has a PCP, but she does not recall the practice name at this time. Patient states that experienced postpartum depression after the birth of her first 2 children (, ); however, she did not require medication/talk therapy. Patient is not currently taking any psychiatric medications. During pregnancy, patient experienced some depression \"here and there\" and some anxiety \"about having her (baby) and bills. \"  Overall, patient states that her depression/anxiety is well managed. Patient reports that she's currently seeing a therapist regularly to prevent any increases in depression/anxiety postpartum. Patient given informational packet on  mood & anxiety disorders (resources/education). Family denies any additional needs from  at this time. Family has 's contact information should any needs/questions arise.     GILA Melendez, [address] [facility]   [phone]

## 2023-03-13 NOTE — CARE COORDINATION
Per RN, patient asked about \"support groups for dads. \"    Upon entering the room, SW inquired about details of information that patient was interested in receiving. Patient responded minimally. Additionally, DEWAYNE Narayan remained silent and denied the need for any resources at this time. SW reviewed information in postpartum depression packet about mental health resources for dads available thru [employer].       provided education/pamphlet on [facility] (community-based program that provides support/education thru baby's 3rd birthday). Patient receptive and would like to participate in program.   will make referral today.     GILA Guzman, [address] [facility]   [phone]

## 2023-03-13 NOTE — PROGRESS NOTES
Progress Note                               Patient: Vy Simpson MRN: [id number]  SSN: [id number]    YOB: 1990  Age: 34 y.o. Sex: female      Postpartum Day Number 2    Subjective:     Patient doing well postpartum without significant complaints. Voiding without difficulty. Patient reports normal lochia. Pain well controlled, voiding on her own without difficulty. Denies HA, SOB/CP, RUQ pain, Vision changes. Does c/o LE edema. Pt declines any narcotic pain meds to go home with. Objective:     Patient Vitals for the past 12 hrs:   Temp Pulse Resp BP SpO2   23 0712 98.5 °F (36.9 °C) 82 16 117/76 99 %   23 2315 98.3 °F (36.8 °C) 80 17 119/74 96 %   23 2129 97.9 °F (36.6 °C) 75 18 126/76 97 %       Temp (24hrs), Av.4 °F (36.9 °C), Min:97.9 °F (36.6 °C), Max:98.9 °F (37.2 °C)    '  No intake or output data in the 24 hours ending 23 3846        Physical Exam:    Constitutional: She appears well-developed and well-nourished. No distress. HENT:    Head: Normocephalic and atraumatic.    Cardiovascular: RRR  Pulmonary/Chest: CTAB  Abd:  S/NTTP/ND, BS normoactive, fundus firm at umbilicus  Ext:  No c/c/e 2+       Lab/Data Review:  Recent Results (from the past 72 hour(s))   CBC with Auto Differential    Collection Time: 03/10/23  6:09 PM   Result Value Ref Range    WBC 8.1 4.3 - 11.1 K/uL    RBC 4.05 4.05 - 5.2 M/uL    Hemoglobin 10.9 (L) 11.7 - 15.4 g/dL    Hematocrit 33.1 (L) 35.8 - 46.3 %    MCV 81.7 (L) 82.0 - 102.0 FL    MCH 26.9 26.1 - 32.9 PG    MCHC 32.9 31.4 - 35.0 g/dL    RDW 15.3 (H) 11.9 - 14.6 %    Platelets 475 545 - 606 K/uL    MPV 9.9 9.4 - 12.3 FL    nRBC 0.00 0.0 - 0.2 K/uL    Differential Type AUTOMATED      Seg Neutrophils 64 43 - 78 %    Lymphocytes 27 13 - 44 %    Monocytes 7 4.0 - 12.0 %    Eosinophils % 1 0.5 - 7.8 %    Basophils 0 0.0 - 2.0 %    Immature Granulocytes 0 0.0 - 5.0 %    Segs Absolute 5.2 1.7 - 8.2 K/UL    Absolute Lymph # 2.2 0.5 - 4.6 K/UL    Absolute Mono # 0.6 0.1 - 1.3 K/UL    Absolute Eos # 0.1 0.0 - 0.8 K/UL    Basophils Absolute 0.0 0.0 - 0.2 K/UL    Absolute Immature Granulocyte 0.0 0.0 - 0.5 K/UL   TYPE AND SCREEN    Collection Time: 03/10/23  6:09 PM   Result Value Ref Range    Crossmatch expiration date 2023,2359     ABO/Rh B POSITIVE     Antibody Screen NEG    POCT Glucose    Collection Time: 03/10/23  6:51 PM   Result Value Ref Range    POC Glucose 91 65 - 100 mg/dL    Performed by: Logan    POCT Glucose    Collection Time: 23 10:00 AM   Result Value Ref Range    POC Glucose 97 65 - 100 mg/dL    Performed by: Vicente    POCT Glucose    Collection Time: 23  2:37 PM   Result Value Ref Range    POC Glucose 88 65 - 100 mg/dL    Performed by: Vicente    POCT Blood Gas, Cord Blood    Collection Time: 23  5:47 PM   Result Value Ref Range    ph, Cord Blood, POC 7.23 7. 15 - 7.38      PCO2, Cord Blood, POC 56 32 - 68 mmHg    PO2, Cord Blood, POC 24 mmHg    HCO3, Mixed 23.6 22 - 26 MMOL/L    SO2c, Arterial, POC 33.0 (L) 95 - 98 %    BASE DEFICIT (POC) 4.6 mmol/L    Specimen type: ARTERIAL CORD      Performed by: Reis (Warren)BS    POCT Blood Gas, Cord Blood    Collection Time: 23  5:48 PM   Result Value Ref Range    ph, Cord Blood, POC 7.38 7.15 - 7.38      PCO2, Cord Blood, POC 39 32 - 68 mmHg    PO2, Cord Blood, POC 38 mmHg    HCO3, Venous 22.8 (L) 23 - 28 MMOL/L    SO2, VENOUS (POC) 70.4 65 - 88 %    BASE DEFICIT (POC) 2.1 mmol/L    Specimen type: VENOUS CORD      Performed by: Lugo (Warren)RTBS          Information for the patient's :  Wilfrido [facility] [[id number]]     Lab Results   Component Value Date/Time    ABORH B POSITIVE 2023 04:56 PM           Assessment and Plan:     34 y.o. [id number] ppd# 2 s/p  at 39w2d after IOL due to A1DM, morbid obesity:    1) PP:  Meeting all pp goals, continue routine care; Lasix 20mg x 1 prior to DC. Appropriate for DC home today  2) Breast feeding/pumping,  Rh pos , Rub imm   3) CF carrier:  pt declined FOB genetic testing.  Peds made aware.        Signed By: Ijeoma Rao MD     March 13, 2023

## 2023-03-13 NOTE — LACTATION NOTE
This note was copied from a baby's chart. In to follow up with mom and infant prior to discharge to home. Mom stated that she feels confident and  has no concerns at this time. Mom and infant are following up with [facility] and she will see lactation consultant there.

## 2023-03-16 ENCOUNTER — FOLLOWUP TELEPHONE ENCOUNTER (OUTPATIENT)
Dept: CASE MANAGEMENT | Age: 33
End: 2023-03-16

## 2023-03-16 NOTE — TELEPHONE ENCOUNTER
Phone call to patient at [phone] to check-in. No answer; message left requesting call back.     GILA Melendez, [address] [facility]   [phone]

## 2023-04-03 RX ORDER — IBUPROFEN 600 MG/1
600 TABLET ORAL EVERY 6 HOURS PRN
Qty: 90 TABLET | Refills: 0 | OUTPATIENT
Start: 2023-04-03

## 2023-04-21 ENCOUNTER — POSTPARTUM VISIT (OUTPATIENT)
Dept: OBGYN CLINIC | Age: 33
End: 2023-04-21

## 2023-04-21 VITALS
DIASTOLIC BLOOD PRESSURE: 84 MMHG | SYSTOLIC BLOOD PRESSURE: 122 MMHG | WEIGHT: 275 LBS | HEIGHT: 67 IN | BODY MASS INDEX: 43.16 KG/M2

## 2023-04-21 DIAGNOSIS — R87.810 CERVICAL HIGH RISK HPV (HUMAN PAPILLOMAVIRUS) TEST POSITIVE: ICD-10-CM

## 2023-04-21 PROBLEM — O24.415 GESTATIONAL DIABETES MELLITUS (GDM) IN THIRD TRIMESTER CONTROLLED ON ORAL HYPOGLYCEMIC DRUG: Status: RESOLVED | Noted: 2022-12-23 | Resolved: 2023-04-21

## 2023-04-21 PROBLEM — O26.843 UTERINE SIZE DATE DISCREPANCY PREGNANCY, THIRD TRIMESTER: Status: RESOLVED | Noted: 2022-12-22 | Resolved: 2023-04-21

## 2023-04-21 PROBLEM — Q51.28 SEPTATE UTERUS AFFECTING PREGNANCY IN THIRD TRIMESTER: Status: RESOLVED | Noted: 2022-08-09 | Resolved: 2023-04-21

## 2023-04-21 PROBLEM — Z34.83 MULTIGRAVIDA IN THIRD TRIMESTER: Status: RESOLVED | Noted: 2022-08-09 | Resolved: 2023-04-21

## 2023-04-21 PROBLEM — O99.013 ANEMIA AFFECTING PREGNANCY IN THIRD TRIMESTER: Status: RESOLVED | Noted: 2022-12-23 | Resolved: 2023-04-21

## 2023-04-21 PROBLEM — O34.03 SEPTATE UTERUS AFFECTING PREGNANCY IN THIRD TRIMESTER: Status: RESOLVED | Noted: 2022-08-09 | Resolved: 2023-04-21

## 2023-04-21 RX ORDER — ACETAMINOPHEN AND CODEINE PHOSPHATE 120; 12 MG/5ML; MG/5ML
1 SOLUTION ORAL DAILY
Qty: 28 TABLET | Refills: 12 | Status: SHIPPED | OUTPATIENT
Start: 2023-04-21

## 2023-04-21 ASSESSMENT — PATIENT HEALTH QUESTIONNAIRE - PHQ9
SUM OF ALL RESPONSES TO PHQ QUESTIONS 1-9: 1
SUM OF ALL RESPONSES TO PHQ9 QUESTIONS 1 & 2: 1
1. LITTLE INTEREST OR PLEASURE IN DOING THINGS: 0
2. FEELING DOWN, DEPRESSED OR HOPELESS: 1
SUM OF ALL RESPONSES TO PHQ QUESTIONS 1-9: 1

## 2023-04-21 NOTE — PATIENT INSTRUCTIONS
We will call you if anything is abnormal from your testing today. You can start your birth control pills today. You should try to take them around the same time each day. Remember the pills may cause irregular bleeding for the first couple of months when starting pills. Follow up as needed or next year for your yearly female exam.  Thanks for coming to see us today and letting us take care of you!

## 2023-04-21 NOTE — PROGRESS NOTES
Patient comes in today for 6 week post partum visit.      Delivery Method: Vaginal     Delivery Date: 03/11/2023    Birth Control: None     Breast/Bottle: Breast     Bleeding: Started on 03/11/2023 and still bleeding today     Baby: Doing well    Bowel/Bladder: Constipation     Blues: None    Last pap smear:  12/08/2021 Negative HPV positive     Vitals:    04/21/23 0926   BP: 122/84        Matt Noble MA  9:31 AM  04/21/23
no clubbing, cyanosis, edema, or deformity noted with normal full range of motion of all joints     Neurologic:  no focal deficits,normal coordination, muscle strength and tone     Skin:   intact without lesions or rashes     Psych:  alert and cooperative; normal mood and affect; normal attention span and concentration    Patient Active Problem List    Diagnosis Date Noted    Cystic fibrosis carrier 2022     Priority: Medium     Overview Note:     22:  ACOG info prev given to pt, D/W her FOB testing and/or genetic counseling -- pt declines      Postpartum exam 2023     Overview Note:      on 3/11/23       Assessment & Plan Note:     Exam as above  Encouraged annual exams with paps as indicated  Pt to F/U with PCP for all non-gyn health related issues       Cervical high risk HPV (human papillomavirus) test positive 2021     Overview Note:     On pap done 2020 in [place] and again 2021:  colpo neg         Assessment & Plan Note:     Repeat today        Problem List Items Addressed This Visit          Other    Cervical high risk HPV (human papillomavirus) test positive     Repeat today         Relevant Orders    PAP IG, CT-NG-TV, Aptima HPV and rfx 16/18,45 ([id number])    Postpartum exam     Exam as above  Encouraged annual exams with paps as indicated  Pt to F/U with PCP for all non-gyn health related issues          Relevant Orders    PAP IG, CT-NG-TV, Aptima HPV and rfx 16/18,45 ([id number])        Arianne Nixon MD   9:52 AM  23

## 2023-04-26 LAB
C TRACH RRNA CVX QL NAA+PROBE: NEGATIVE
CYTOLOGIST CVX/VAG CYTO: NORMAL
CYTOLOGY CVX/VAG DOC THIN PREP: NORMAL
HPV APTIMA: NEGATIVE
HPV GENOTYPE REFLEX: NORMAL
Lab: NORMAL
N GONORRHOEA RRNA CVX QL NAA+PROBE: NEGATIVE
PATH REPORT.FINAL DX SPEC: NORMAL
STAT OF ADQ CVX/VAG CYTO-IMP: NORMAL
T VAGINALIS RRNA SPEC QL NAA+PROBE: NEGATIVE

## 2024-02-10 ENCOUNTER — APPOINTMENT (OUTPATIENT)
Dept: GENERAL RADIOLOGY | Age: 34
End: 2024-02-10
Payer: COMMERCIAL

## 2024-02-10 ENCOUNTER — HOSPITAL ENCOUNTER (EMERGENCY)
Age: 34
Discharge: HOME | End: 2024-02-10
Attending: EMERGENCY MEDICINE
Payer: COMMERCIAL

## 2024-02-10 VITALS
DIASTOLIC BLOOD PRESSURE: 78 MMHG | SYSTOLIC BLOOD PRESSURE: 138 MMHG | RESPIRATION RATE: 16 BRPM | HEIGHT: 67 IN | TEMPERATURE: 98 F | OXYGEN SATURATION: 100 % | BODY MASS INDEX: 42.53 KG/M2 | WEIGHT: 271 LBS | HEART RATE: 80 BPM

## 2024-02-10 DIAGNOSIS — M25.532 ACUTE PAIN OF LEFT WRIST: Primary | ICD-10-CM

## 2024-02-10 PROCEDURE — 73110 X-RAY EXAM OF WRIST: CPT

## 2024-02-10 PROCEDURE — 99283 EMERGENCY DEPT VISIT LOW MDM: CPT

## 2024-02-10 RX ORDER — TRAMADOL HYDROCHLORIDE 50 MG/1
50 TABLET ORAL EVERY 6 HOURS PRN
Qty: 12 TABLET | Refills: 0 | Status: SHIPPED | OUTPATIENT
Start: 2024-02-10 | End: 2024-02-13

## 2024-02-10 RX ORDER — METHYLPREDNISOLONE 4 MG/1
TABLET ORAL
Qty: 1 KIT | Refills: 0 | Status: SHIPPED | OUTPATIENT
Start: 2024-02-10

## 2024-02-10 ASSESSMENT — PAIN - FUNCTIONAL ASSESSMENT
PAIN_FUNCTIONAL_ASSESSMENT: 0-10
PAIN_FUNCTIONAL_ASSESSMENT: ACTIVITIES ARE NOT PREVENTED

## 2024-02-10 ASSESSMENT — LIFESTYLE VARIABLES
HOW MANY STANDARD DRINKS CONTAINING ALCOHOL DO YOU HAVE ON A TYPICAL DAY: 1 OR 2
HOW OFTEN DO YOU HAVE A DRINK CONTAINING ALCOHOL: MONTHLY OR LESS

## 2024-02-10 ASSESSMENT — PAIN DESCRIPTION - ORIENTATION: ORIENTATION: LEFT

## 2024-02-10 ASSESSMENT — PAIN DESCRIPTION - PAIN TYPE: TYPE: ACUTE PAIN

## 2024-02-10 ASSESSMENT — PAIN DESCRIPTION - ONSET: ONSET: PROGRESSIVE

## 2024-02-10 ASSESSMENT — PAIN DESCRIPTION - LOCATION: LOCATION: WRIST

## 2024-02-10 ASSESSMENT — PAIN DESCRIPTION - DESCRIPTORS: DESCRIPTORS: THROBBING;BURNING

## 2024-02-10 ASSESSMENT — PAIN SCALES - GENERAL
PAINLEVEL_OUTOF10: 7
PAINLEVEL_OUTOF10: 7

## 2024-02-10 NOTE — ED PROVIDER NOTES
Home Care:  · Application of superficial heat (thermacare patches, heating pad etc.)  · Home based exercises  · NSAIDS (Non-steroidal anti-inflammatories example ibuprofen)    (Diagnosis and Treatment of Low Back Pain: A Joint Clinical Practice  Guideline from the American College of Physicians and the American  Pain Society Annals of Internal Medicine  Issue: Volume 147(7), 2 October 2007, pp I-38)        Emergency Department Provider Note       PCP: None, None   Age: 33 y.o.   Sex: female     DISPOSITION Decision To Discharge 02/10/2024 09:58:15 AM       ICD-10-CM    1. Acute pain of left wrist  M25.532 traMADol (ULTRAM) 50 MG tablet     [facility]          Medical Decision Making     Complexity of Problems Addressed:  Complexity of Problem: 1 acute, uncomplicated illness or injury.    Data Reviewed and Analyzed:  I independently ordered and reviewed each unique test.         I interpreted the X-rays.  X-ray left wrist revealed a possible small fracture along the ventral aspect of the proximal wrist thought to arise from the anterior margin of the scaphoid bone.    Discussion of management or test interpretation.  33-year-old female presents with wrist pain and decreased range of motion starting 2 days ago, awakened from sleep with the discomfort.  No history of trauma.  On exam, there is no evidence of swelling or deformity, and the patient's tenderness is localized to the volar aspect of the wrist.  Negative Tinel's, negative Phalen's.  X-ray with a questionable small chip fracture along the ventral aspect of the scaphoid bone, I highly doubt this is acute.  Patient be given a wrist brace and referred to orthopedics for follow-up.  He will be given a short course of Ultram for pain, and a Medrol Dosepak.       Risk of Complications and/or Morbidity of Patient Management:  Prescription drug management performed        History      33-year-old female presents to the emergency department complaining of left wrist pain with decreased range of motion starting 2 days ago when she awakened from sleep.  She denies any history of trauma, and states since that time the pain is progressed especially after working last night.  She denies any paralysis or paresthesias.    The history is provided by the patient.        Physical Exam     Vitals signs and nursing note reviewed.   Vitals:     History:   Diagnosis Date    Abnormal Papanicolaou smear of cervix     Anemia     Diabetes mellitus (HCC)     Dysthymia 2/3/2022        Past Surgical History:   Procedure Laterality Date    STOMACH SURGERY  2020    gastric sleeve        Results for orders placed or performed during the hospital encounter of 02/10/24   XR WRIST LEFT (MIN 3 VIEWS)    Narrative    XR WRIST LEFT (MIN 3 VIEWS)      INDICATION: Age: 33 years. Gender: Female. :  Reason for exam:->Wrist pain    COMPARISON: None    FINDINGS:    On the lateral view there is a small 2 mm calcific density adjacent to the  anterior margin of the scaphoid bone, possibly representing a small cortical  fracture from the ventral aspect of the wrist, although there is no clear donor  site for this possible small fracture fragment. Alternately this may represent  small nonspecific soft tissue calcification. Wrist bones demonstrate normal  alignment. No soft tissue swelling noted.      Impression    Possible small fracture along the ventral aspect of the proximal wrist, possibly  arising from the anterior margin of the scaphoid bone, recommend correlation for  focal pain in this region. No displaced fracture.          XR WRIST LEFT (MIN 3 VIEWS)   Final Result      Possible small fracture along the ventral aspect of the proximal wrist, possibly   arising from the anterior margin of the scaphoid bone, recommend correlation for   focal pain in this region. No displaced fracture.               Voice dictation software was used during the making of this note.  This software is not perfect and grammatical and other typographical errors may be present.  This note has not been completely proofread for errors.     Sylvain Hatfield MD  02/10/24 1921

## 2024-02-10 NOTE — ED TRIAGE NOTES
Pt to the ED c/o of left wrist pain. Pt states that she woke up on thursday with pain to her wrist. Pt states that the pain got worse last night while she was at work. No known injury.

## 2024-02-10 NOTE — DISCHARGE INSTRUCTIONS
Radiologist sees a possible small fracture along the ventral aspect of the wrist.  Recommend wearing the splint and follow-up with orthopedics.

## 2024-02-10 NOTE — ED NOTES
I have reviewed discharge instructions with the patient.  The patient verbalized understanding.    Patient left ED via Discharge Method: ambulatory to Home with self.    Opportunity for questions and clarification provided.       Patient given 2 scripts.         To continue your aftercare when you leave the hospital, you may receive an automated call from our care team to check in on how you are doing.  This is a free service and part of our promise to provide the best care and service to meet your aftercare needs.” If you have questions, or wish to unsubscribe from this service please call [phone].  Thank you for Choosing our [facility] Emergency Department.

## 2024-02-13 ENCOUNTER — OFFICE VISIT (OUTPATIENT)
Dept: ORTHOPEDIC SURGERY | Age: 34
End: 2024-02-13
Payer: COMMERCIAL

## 2024-02-13 VITALS — WEIGHT: 271 LBS | BODY MASS INDEX: 42.53 KG/M2 | HEIGHT: 67 IN

## 2024-02-13 DIAGNOSIS — M77.8 LEFT WRIST TENDONITIS: Primary | ICD-10-CM

## 2024-02-13 PROCEDURE — 99204 OFFICE O/P NEW MOD 45 MIN: CPT | Performed by: ORTHOPAEDIC SURGERY

## 2024-02-13 RX ORDER — MELOXICAM 7.5 MG/1
7.5 TABLET ORAL 2 TIMES DAILY
Qty: 42 TABLET | Refills: 0 | Status: SHIPPED | OUTPATIENT
Start: 2024-02-13 | End: 2024-03-05

## 2024-02-13 NOTE — PROGRESS NOTES
Orthopaedic Hand Clinic Note    Name: Amelia Newton  YOB: 1990  Gender: female  MRN: [id number]      CC: Patient referred for evaluation of upper extremity pain    HPI: Amelia Newton is a 33 y.o. female with a chief complaint of left wrist pain which started on 2/8/24. She has no history of injury. The whole wrist is painful, and she was seen in the ED as a result. She says she has occasional numbness  in the hand, but this is not what she was experiencing at the time. She was given a prescription for a medrol dosepack, and this has helped      ROS/Meds/PSH/PMH/FH/SH: I personally reviewed the patients standard intake form.  Pertinents are discussed in the HPI    Physical Examination:    Musculoskeletal Exam:  Examination on the left upper extremity demonstrates cap refill < 5 seconds in all fingers, there is no warmth, swelling, or erythema. There is tenderness diffusely over the dorsum of the wrist and 4th dorsal compartment. No particular tenderness over the 1st dorsal compartment or site of intersection syndrome. There is pain with wrist flexion and extension. Finkelsteins test is negative.    Imaging / Electrodiagnostic Tests:     I independently reviewed and interpreted left wrist radiographs.  They demonstrate no acute fracture or dislocation      Assessment:     ICD-10-CM    1. Left wrist tendonitis  M77.8 meloxicam (MOBIC) 7.5 MG tablet          Plan:   We discussed the diagnosis and different treatment options. We discussed observation, therapy, antiinflammatory medications and other pertinent treatment modalities.    After discussing in detail the patient elects to proceed with use of a wrist brace for comfort, prescription for mobic. She will follow up as needed    Patient voiced accordance and understanding of the information provided and the formulated plan. All questions were answered to the patient's satisfaction during the encounter.      Cinda Gottlieb MD  Orthopaedic

## 2024-02-15 ENCOUNTER — TELEPHONE (OUTPATIENT)
Dept: ORTHOPEDIC SURGERY | Age: 34
End: 2024-02-15

## 2024-02-15 NOTE — TELEPHONE ENCOUNTER
I spoke w/ Ms. Newton and told her that she may return to regular duty work with no restrictions.  I will put the work note in her chart and she can access it from her MyChart which she said she has. She voiced understanding.

## 2024-02-15 NOTE — TELEPHONE ENCOUNTER
She wants to know if she can get a note releasing her back to work fulltime with or without restrictions, whatever  Friend thinks. Please give her a call to discuss.

## 2025-04-09 NOTE — PROGRESS NOTES
Patient here for AE.   Patient would like to discuss BC options. Patient unsure if she will go on BC but wants to discuss what she could potentially be on.   Patient states she does not want to pills and had negative experience with IUD.     LAST PAP:  4/21/2023, neg., HPV neg.     LAST MAMMO:  never     LMP:  Patient's last menstrual period was 03/28/2025 (exact date).    BIRTH CONTROL:  condoms     TOBACCO USE:  No    FAMILY HISTORY OF:   Breast Cancer:  No   Ovarian Cancer:  No   Uterine Cancer:  No   Colon Cancer:  No    Vitals:    04/10/25 0932   BP: 126/64   BP Site: Right Upper Arm   Patient Position: Sitting   Weight: 115.7 kg (255 lb)   Height: 1.702 m (5' 7\")        RAÚL FRANKLIN RN  04/10/25  9:43 AM

## 2025-04-10 ENCOUNTER — OFFICE VISIT (OUTPATIENT)
Dept: OBGYN CLINIC | Age: 35
End: 2025-04-10
Payer: COMMERCIAL

## 2025-04-10 VITALS
WEIGHT: 255 LBS | DIASTOLIC BLOOD PRESSURE: 64 MMHG | HEIGHT: 67 IN | SYSTOLIC BLOOD PRESSURE: 126 MMHG | BODY MASS INDEX: 40.02 KG/M2

## 2025-04-10 DIAGNOSIS — Z30.09 CONTRACEPTIVE EDUCATION: ICD-10-CM

## 2025-04-10 DIAGNOSIS — Z01.419 WOMEN'S ANNUAL ROUTINE GYNECOLOGICAL EXAMINATION: Primary | ICD-10-CM

## 2025-04-10 PROBLEM — R87.810 CERVICAL HIGH RISK HPV (HUMAN PAPILLOMAVIRUS) TEST POSITIVE: Status: RESOLVED | Noted: 2021-12-08 | Resolved: 2025-04-10

## 2025-04-10 PROCEDURE — 99395 PREV VISIT EST AGE 18-39: CPT | Performed by: OBSTETRICS & GYNECOLOGY

## 2025-04-10 PROCEDURE — 99459 PELVIC EXAMINATION: CPT | Performed by: OBSTETRICS & GYNECOLOGY

## 2025-04-10 NOTE — PROGRESS NOTES
Chaperone for Intimate Exam     Chaperone was offer accepted as part of the rooming process    Chaperone: Tonya POWELL

## 2025-04-10 NOTE — ASSESSMENT & PLAN NOTE
D/W pt at length multiple options, risks, benefits, SE's about OCP's, Nuvaring, Nexplanon, Depo and IUD including but not limited to H/A, N/V, thromboembolic events, bleeding patterns, weight gain and efficacy.  Also, reminded pt that these methods do NOT protect against STD's - barrier method is also encouraged.   Pt desires IUD but not Mirena due to prev discomfort - Shoaib paperwork done today

## 2025-04-10 NOTE — PROGRESS NOTES
[facility]  [address], [place] [address]  [phone]    Jarad Wooten MD, FACOG  [facility]      Assessment/Plan     Patient Active Problem List    Diagnosis Date Noted    Cystic fibrosis carrier 08/31/2022     Priority: Medium     Overview Note:     9/8/22:  on PNL's      Contraceptive education 11/10/2021     Overview Note:     noted         Assessment & Plan Note:     D/W pt at length multiple options, risks, benefits, SE's about OCP's, Nuvaring, Nexplanon, Depo and IUD including but not limited to H/A, N/V, thromboembolic events, bleeding patterns, weight gain and efficacy.  Also, reminded pt that these methods do NOT protect against STD's - barrier method is also encouraged.   Pt desires IUD but not Mirena due to prev discomfort - Kyleena paperwork done today      Women's annual routine gynecological examination 11/10/2021     Overview Note:     Pap + HPV done 4/21/23 -- neg, neg HPV         Assessment & Plan Note:     Exam as below  Encouraged annual exams with paps as indicated  Pt to F/U with PCP for all non-gyn health related issues         Problem List Items Addressed This Visit       Women's annual routine gynecological examination - Primary    Exam as below  Encouraged annual exams with paps as indicated  Pt to F/U with PCP for all non-gyn health related issues          Contraceptive education    D/W pt at length multiple options, risks, benefits, SE's about OCP's, Nuvaring, Nexplanon, Depo and IUD including but not limited to H/A, N/V, thromboembolic events, bleeding patterns, weight gain and efficacy.  Also, reminded pt that these methods do NOT protect against STD's - barrier method is also encouraged.   Pt desires IUD but not Mirena due to prev discomfort - Kyleena paperwork done today             Subjective     LAST PAP:  4/21/2023, neg., HPV neg.      LAST MAMMO:  never      LMP:  Patient's last menstrual period was 03/28/2025 (exact date).     BIRTH

## 2025-04-30 PROBLEM — Z30.09 CONTRACEPTIVE EDUCATION: Status: RESOLVED | Noted: 2021-11-10 | Resolved: 2025-04-30

## 2025-04-30 PROBLEM — Z30.430 ENCOUNTER FOR IUD INSERTION: Status: ACTIVE | Noted: 2025-04-30

## 2025-05-10 PROBLEM — Z01.419 WOMEN'S ANNUAL ROUTINE GYNECOLOGICAL EXAMINATION: Status: RESOLVED | Noted: 2021-11-10 | Resolved: 2025-05-10

## 2025-06-02 ENCOUNTER — OFFICE VISIT (OUTPATIENT)
Dept: OBGYN CLINIC | Age: 35
End: 2025-06-02
Payer: COMMERCIAL

## 2025-06-02 VITALS
BODY MASS INDEX: 40.02 KG/M2 | SYSTOLIC BLOOD PRESSURE: 124 MMHG | DIASTOLIC BLOOD PRESSURE: 72 MMHG | HEIGHT: 67 IN | WEIGHT: 255 LBS

## 2025-06-02 DIAGNOSIS — Z30.431 IUD CHECK UP: Primary | ICD-10-CM

## 2025-06-02 PROCEDURE — 99459 PELVIC EXAMINATION: CPT | Performed by: OBSTETRICS & GYNECOLOGY

## 2025-06-02 PROCEDURE — 99212 OFFICE O/P EST SF 10 MIN: CPT | Performed by: OBSTETRICS & GYNECOLOGY

## 2025-06-02 RX ORDER — BUTALBITAL, ACETAMINOPHEN AND CAFFEINE 50; 325; 40 MG/1; MG/1; MG/1
TABLET ORAL
COMMUNITY

## 2025-06-02 NOTE — PROGRESS NOTES
Patient here for string check.   Patient had IUD inserted on 4/30/2025, had heavy bleeding then that lasted until 5/28/2025.   Denies bleeding today.   Patient showed me picture where she had clots the size of a baseball.     LAST PAP:  4/21/2023, neg., HPV neg.     LAST MAMMO:  never     LMP:  No LMP recorded.    BIRTH CONTROL:  IUD    TOBACCO USE:  No    FAMILY HISTORY OF:   Breast Cancer:  No   Ovarian Cancer:  No   Uterine Cancer:  No   Colon Cancer:  No    Vitals:    06/02/25 0856   BP: 124/72   BP Site: Left Upper Arm   Patient Position: Sitting   Weight: 115.7 kg (255 lb)   Height: 1.702 m (5' 7\")        RAÚL FRANKLIN RN  06/02/25  9:02 AM

## 2025-06-02 NOTE — ASSESSMENT & PLAN NOTE
Strings normally at os  D/W pt that irreg bleeding common for first 1-3 cycles after insertion, will obs  If persists, may add short course POPs or OCPs for regulation

## 2025-06-02 NOTE — PROGRESS NOTES
Chaperone for Intimate Exam     Chaperone was offer accepted as part of the rooming process    Chaperone: Zoey Milligan

## 2025-06-02 NOTE — PROGRESS NOTES
[facility]  [address], [place] [address]  [phone]    Jarad Wooten MD, FACOG  [facility]    IUD String check    Pt here for IUD string check.  Pt reports some heavy bleeding after insertion.  None currently  Denies any pelvic pain, D/C, F/C, GI/ issues    Vitals:    06/02/25 0856   BP: 124/72   BP Site: Left Upper Arm   Patient Position: Sitting   Weight: 115.7 kg (255 lb)   Height: 1.702 m (5' 7\")       CV - RRR  Lungs - CT bilat  Abdom - soft, NT, NABS  Pelvic Exam:     External: normal female genitalia without lesions or masses     Vagina: normal without lesions or masses     Cervix: normal without lesions or masses, strings noted at os    Problem List Items Addressed This Visit       IUD check up - Primary    Strings normally at os  D/W pt that irreg bleeding common for first 1-3 cycles after insertion, will obs  If persists, may add short course POPs or OCPs for regulation             The physical exam portion of the visit was chaperoned by female staff member     Jarad Wooten MD  9:08 AM  06/02/25

## 2025-08-13 ENCOUNTER — HOSPITAL ENCOUNTER (EMERGENCY)
Age: 35
Discharge: HOME | End: 2025-08-13
Attending: STUDENT IN AN ORGANIZED HEALTH CARE EDUCATION/TRAINING PROGRAM
Payer: COMMERCIAL

## 2025-08-13 VITALS
WEIGHT: 250 LBS | OXYGEN SATURATION: 99 % | BODY MASS INDEX: 39.24 KG/M2 | HEART RATE: 82 BPM | TEMPERATURE: 98.5 F | DIASTOLIC BLOOD PRESSURE: 87 MMHG | SYSTOLIC BLOOD PRESSURE: 120 MMHG | RESPIRATION RATE: 17 BRPM | HEIGHT: 67 IN

## 2025-08-13 DIAGNOSIS — R60.0 BILATERAL LOWER EXTREMITY EDEMA: ICD-10-CM

## 2025-08-13 DIAGNOSIS — D50.9 MICROCYTIC ANEMIA: Primary | ICD-10-CM

## 2025-08-13 LAB
ALBUMIN SERPL-MCNC: 3.9 G/DL (ref 3.5–5)
ALBUMIN/GLOB SERPL: 1.1 (ref 1–1.9)
ALP SERPL-CCNC: 76 U/L (ref 35–104)
ALT SERPL-CCNC: 11 U/L (ref 12–65)
ANION GAP SERPL CALC-SCNC: 12 MMOL/L (ref 7–16)
AST SERPL-CCNC: 19 U/L (ref 15–37)
BASOPHILS # BLD: 0.07 K/UL (ref 0–0.2)
BASOPHILS NFR BLD: 0.8 % (ref 0–2)
BILIRUB SERPL-MCNC: <0.2 MG/DL (ref 0–1.2)
BUN SERPL-MCNC: 12 MG/DL (ref 6–23)
CALCIUM SERPL-MCNC: 8.8 MG/DL (ref 8.8–10.2)
CHLORIDE SERPL-SCNC: 104 MMOL/L (ref 98–107)
CO2 SERPL-SCNC: 24 MMOL/L (ref 20–29)
CREAT SERPL-MCNC: 0.67 MG/DL (ref 0.8–1.3)
D DIMER PPP FEU-MCNC: <0.27 UG/ML(FEU)
DIFFERENTIAL METHOD BLD: ABNORMAL
EOSINOPHIL # BLD: 0.11 K/UL (ref 0–0.8)
EOSINOPHIL NFR BLD: 1.3 % (ref 0.5–7.8)
ERYTHROCYTE [DISTWIDTH] IN BLOOD BY AUTOMATED COUNT: 15.9 % (ref 11.9–14.6)
GLOBULIN SER CALC-MCNC: 3.5 G/DL (ref 2.3–3.5)
GLUCOSE SERPL-MCNC: 111 MG/DL (ref 70–99)
HCT VFR BLD AUTO: 27.7 % (ref 35.8–46.3)
HGB BLD-MCNC: 8.3 G/DL (ref 11.7–15.4)
IMM GRANULOCYTES # BLD AUTO: 0.02 K/UL (ref 0–0.5)
IMM GRANULOCYTES NFR BLD AUTO: 0.2 % (ref 0–5)
LYMPHOCYTES # BLD: 2.1 K/UL (ref 0.5–4.6)
LYMPHOCYTES NFR BLD: 24.6 % (ref 13–44)
MCH RBC QN AUTO: 22.7 PG (ref 26.1–32.9)
MCHC RBC AUTO-ENTMCNC: 30 G/DL (ref 31.4–35)
MCV RBC AUTO: 75.7 FL (ref 82–102)
MONOCYTES # BLD: 0.58 K/UL (ref 0.1–1.3)
MONOCYTES NFR BLD: 6.8 % (ref 4–12)
NEUTS SEG # BLD: 5.65 K/UL (ref 1.7–8.2)
NEUTS SEG NFR BLD: 66.3 % (ref 43–78)
NRBC # BLD: 0 K/UL (ref 0–0.2)
NT PRO BNP: 54 PG/ML (ref 0–450)
PLATELET # BLD AUTO: 564 K/UL (ref 150–450)
PMV BLD AUTO: 9.4 FL (ref 9.4–12.3)
POTASSIUM SERPL-SCNC: 4 MMOL/L (ref 3.5–5.1)
PROT SERPL-MCNC: 7.4 G/DL (ref 6.3–8.2)
RBC # BLD AUTO: 3.66 M/UL (ref 4.05–5.2)
SODIUM SERPL-SCNC: 140 MMOL/L (ref 133–143)
WBC # BLD AUTO: 8.5 K/UL (ref 4.3–11.1)

## 2025-08-13 PROCEDURE — 99283 EMERGENCY DEPT VISIT LOW MDM: CPT

## 2025-08-13 PROCEDURE — 85379 FIBRIN DEGRADATION QUANT: CPT

## 2025-08-13 PROCEDURE — 83880 ASSAY OF NATRIURETIC PEPTIDE: CPT

## 2025-08-13 PROCEDURE — 85025 COMPLETE CBC W/AUTO DIFF WBC: CPT

## 2025-08-13 PROCEDURE — 80053 COMPREHEN METABOLIC PANEL: CPT

## 2025-08-13 ASSESSMENT — PAIN - FUNCTIONAL ASSESSMENT: PAIN_FUNCTIONAL_ASSESSMENT: 0-10

## 2025-08-13 ASSESSMENT — PAIN DESCRIPTION - LOCATION: LOCATION: LEG

## 2025-08-13 ASSESSMENT — PAIN DESCRIPTION - ORIENTATION: ORIENTATION: RIGHT

## 2025-08-13 ASSESSMENT — PAIN SCALES - GENERAL: PAINLEVEL_OUTOF10: 5
